# Patient Record
Sex: MALE | Race: WHITE | Employment: FULL TIME | URBAN - METROPOLITAN AREA
[De-identification: names, ages, dates, MRNs, and addresses within clinical notes are randomized per-mention and may not be internally consistent; named-entity substitution may affect disease eponyms.]

---

## 2017-01-23 ENCOUNTER — OFFICE VISIT (OUTPATIENT)
Dept: FAMILY MEDICINE | Facility: CLINIC | Age: 62
End: 2017-01-23
Payer: COMMERCIAL

## 2017-01-23 VITALS
HEART RATE: 111 BPM | TEMPERATURE: 103.9 F | WEIGHT: 191 LBS | OXYGEN SATURATION: 98 % | DIASTOLIC BLOOD PRESSURE: 70 MMHG | BODY MASS INDEX: 25.31 KG/M2 | SYSTOLIC BLOOD PRESSURE: 109 MMHG | HEIGHT: 73 IN

## 2017-01-23 DIAGNOSIS — R31.9 URINARY TRACT INFECTION WITH HEMATURIA, SITE UNSPECIFIED: Primary | ICD-10-CM

## 2017-01-23 DIAGNOSIS — R31.9 BLOOD IN THE URINE: ICD-10-CM

## 2017-01-23 DIAGNOSIS — R82.90 NONSPECIFIC FINDING ON EXAMINATION OF URINE: ICD-10-CM

## 2017-01-23 DIAGNOSIS — N39.0 URINARY TRACT INFECTION WITH HEMATURIA, SITE UNSPECIFIED: Primary | ICD-10-CM

## 2017-01-23 DIAGNOSIS — R30.0 DYSURIA: ICD-10-CM

## 2017-01-23 LAB
ALBUMIN UR-MCNC: 30 MG/DL
APPEARANCE UR: ABNORMAL
BACTERIA #/AREA URNS HPF: ABNORMAL /HPF
BILIRUB UR QL STRIP: NEGATIVE
COLOR UR AUTO: YELLOW
GLUCOSE UR STRIP-MCNC: NEGATIVE MG/DL
HGB UR QL STRIP: ABNORMAL
KETONES UR STRIP-MCNC: NEGATIVE MG/DL
LEUKOCYTE ESTERASE UR QL STRIP: ABNORMAL
NITRATE UR QL: NEGATIVE
NON-SQ EPI CELLS #/AREA URNS LPF: ABNORMAL /LPF
PH UR STRIP: 5.5 PH (ref 5–7)
RBC #/AREA URNS AUTO: ABNORMAL /HPF (ref 0–2)
SP GR UR STRIP: <=1.005 (ref 1–1.03)
URN SPEC COLLECT METH UR: ABNORMAL
UROBILINOGEN UR STRIP-ACNC: 0.2 EU/DL (ref 0.2–1)
WBC #/AREA URNS AUTO: ABNORMAL /HPF (ref 0–2)

## 2017-01-23 PROCEDURE — 87088 URINE BACTERIA CULTURE: CPT | Performed by: FAMILY MEDICINE

## 2017-01-23 PROCEDURE — 99213 OFFICE O/P EST LOW 20 MIN: CPT | Performed by: FAMILY MEDICINE

## 2017-01-23 PROCEDURE — 81001 URINALYSIS AUTO W/SCOPE: CPT | Performed by: FAMILY MEDICINE

## 2017-01-23 PROCEDURE — 87086 URINE CULTURE/COLONY COUNT: CPT | Performed by: FAMILY MEDICINE

## 2017-01-23 PROCEDURE — 87186 SC STD MICRODIL/AGAR DIL: CPT | Performed by: FAMILY MEDICINE

## 2017-01-23 RX ORDER — LEVOFLOXACIN 750 MG/1
750 TABLET, FILM COATED ORAL DAILY
Qty: 7 TABLET | Refills: 0 | Status: SHIPPED | OUTPATIENT
Start: 2017-01-23 | End: 2017-03-23

## 2017-01-23 NOTE — NURSING NOTE
"Chief Complaint   Patient presents with     UTI       Initial /70 mmHg  Pulse 111  Temp(Src) 103.9  F (39.9  C) (Tympanic)  Ht 6' 1\" (1.854 m)  Wt 191 lb (86.637 kg)  BMI 25.20 kg/m2  SpO2 98% Estimated body mass index is 25.2 kg/(m^2) as calculated from the following:    Height as of this encounter: 6' 1\" (1.854 m).    Weight as of this encounter: 191 lb (86.637 kg).  BP completed using cuff size: regular  "

## 2017-01-23 NOTE — PROGRESS NOTES
SUBJECTIVE:                                                    Hawk Nation is a 61 year old male who presents to clinic today for the following health issues:      Genitourinary - Male     Onset: 6 DAYS      Description:   Dysuria (painful urination): YES  Hematuria (blood in urine): YES  Frequency: YES  Are you urinating at night : YES  Hesitancy (delay in urine): YES  Retention (unable to empty): YES  Decrease in urinary flow: YES  Incontinence: no     Progression of Symptoms:  worsening    Accompanying Signs & Symptoms:  Fever: YES- 103.9  Back/Flank pain: YES  Urethral discharge: no   Testicle lumps/masses/pain: no   Nausea and/or vomiting: no   Abdominal pain: no    History:   History of frequent UTI's: YES  History of kidney stones: no   History of hernias: no   Personal or Family history of Prostate problems: no  Sexually active: YES    Precipitating factors:       Alleviating factors:           Therapies Tried and outcome: ibuprofen and tylenol ; Outcome - none      Problem list and histories reviewed & adjusted, as indicated.  Additional history: as documented    Patient Active Problem List   Diagnosis     Depression, major     Erectile dysfunction     GERD (gastroesophageal reflux disease)     Meniere's disease     BPH (benign prostatic hyperplasia)     Incontinence of urine     Hyperlipidemia LDL goal <130     Advanced directives, counseling/discussion     Back pain, chronic     Humerus fracture     Past Surgical History   Procedure Laterality Date     Ent surgery       deviated septum at age 15      Surgical history of -        rectal fistula      C nonspecific procedure  10/15/10     [employer] Laser Ablation of Prostate     Surgical history of -   1/2012     [place]: external sphincter for incontinence       Social History   Substance Use Topics     Smoking status: Former Smoker     Quit date: 01/01/1981     Smokeless tobacco: Former User      Comment: 10 pack year history      Alcohol Use: 0.0  oz/week     0 Standard drinks or equivalent per week      Comment: 12pack per month     Family History   Problem Relation Age of Onset     Depression Sister      Depression Father      Depression Mother      HEART DISEASE Mother      CAD      Lipids Mother      Alzheimer Disease Father      Lipids Maternal Grandmother      Lipids Maternal Grandfather      DIABETES Father      C.A.D. Sister      Asthma No family hx of      Hypertension Father      Cancer - colorectal No family hx of      Prostate Cancer No family hx of      Eye Disorder No family hx of      Thyroid Disease No family hx of          Current Outpatient Prescriptions   Medication Sig Dispense Refill     levofloxacin (LEVAQUIN) 750 MG tablet Take 1 tablet (750 mg) by mouth daily 7 tablet 0     simvastatin (ZOCOR) 40 MG tablet Take 1 tablet by mouth at  bedtime 90 tablet 3     CINNAMON PO Take 600 mg by mouth       NEW MED Tumeric 1.4 bid       Glucosamine-Chondroit-Vit C-Mn (GLUCOSAMINE 1500 COMPLEX) CAPS Take 1 tablet by mouth 2 times daily       ascorbic acid (VITAMIN C) 1000 MG TABS Take 1 tablet (1,000 mg) by mouth daily       traZODone (DESYREL) 50 MG tablet Take 1-2 tablets ( mg) by mouth At Bedtime 90 tablet 1     meclizine (ANTIVERT) 12.5 MG tablet Take 1-2 tablets (12.5-25 mg) by mouth 3 times daily as needed for dizziness 90 tablet 0     PARoxetine (PAXIL) 40 MG tablet Take 1 tablet (40 mg) by mouth daily 90 tablet 1     cyclobenzaprine (FLEXERIL) 10 MG tablet Take 1 tablet (10 mg) by mouth 3 times daily as needed for muscle spasms 90 tablet 1     psyllium (METAMUCIL) 58.6 % POWD Take 4 teaspoonful by mouth 2 times daily       Ranitidine HCl (ZANTAC PO) Take 150 mg by mouth daily as needed       calcium carbonate (TUMS) 500 MG chewable tablet Take 1 chew tab by mouth daily as needed       VITAMIN D, CHOLECALCIFEROL, PO Take 2,000 Units by mouth daily       LANsoprazole (PREVACID) 15 MG capsule Take 1 capsule by mouth 2 times daily.    "    Allergies   Allergen Reactions     Nkda [No Known Drug Allergies]      Recent Labs   Lab Test  03/07/16   0853  03/05/15   0859 02/10/14   08/29/13   1806 03/04/13   A1C   --    --    --    --    --   5.7   LDL  115*  90  117   --    --   115   HDL  56  65  66   --    --   59   TRIG  70  76  76   --    --   61   ALT  28  26   --    --   33   --    CR  1.02  1.00   --    < >  1.16   --    GFRESTIMATED  74  76   --    < >  64   --    GFRESTBLACK  89  >90   GFR Calc     --    < >  78   --    POTASSIUM  4.0  4.1   --    < >  4.3   --     < > = values in this interval not displayed.      BP Readings from Last 3 Encounters:   01/23/17 109/70   03/07/16 110/74   01/27/16 138/80    Wt Readings from Last 3 Encounters:   01/23/17 191 lb (86.637 kg)   03/07/16 179 lb (81.194 kg)   01/27/16 190 lb (86.183 kg)                    ROS:  Constitutional, HEENT, cardiovascular, pulmonary, gi and gu systems are negative, except as otherwise noted.    OBJECTIVE:                                                    /70 mmHg  Pulse 111  Temp(Src) 103.9  F (39.9  C) (Tympanic)  Ht 6' 1\" (1.854 m)  Wt 191 lb (86.637 kg)  BMI 25.20 kg/m2  SpO2 98%  Body mass index is 25.2 kg/(m^2).  GENERAL: healthy, alert and no distress  NECK: no adenopathy, no asymmetry, masses, or scars and thyroid normal to palpation  RESP: lungs clear to auscultation - no rales, rhonchi or wheezes  CV: regular rate and rhythm, normal S1 S2, no S3 or S4, no murmur, click or rub, no peripheral edema and peripheral pulses strong  ABDOMEN: soft, nontender, no hepatosplenomegaly, no masses and bowel sounds normal  MS: no gross musculoskeletal defects noted, no edema         ASSESSMENT/PLAN:                                                    Hawk was seen today for uti.    Diagnoses and all orders for this visit:    Urinary tract infection with hematuria, site unspecified  -     levofloxacin (LEVAQUIN) 750 MG tablet; Take 1 tablet (750 mg) " by mouth daily    Dysuria  -     *UA reflex to Microscopic and Culture ([facility] (except [place] and [place])  -     Urine Microscopic    Blood in the urine  -     *UA reflex to Microscopic and Culture ([facility] (except [place] and [place])    Nonspecific finding on examination of urine  -     Urine Culture Aerobic Bacterial      Has high F/C/diaphoresis, has muscle pain, UA showed sign of UTI, will have him to take abx  If no symptomatic improvement by tomorrow, will have him to go to ER. Pt acknowledged and agreed with the plan       Tj Stover MD  [facility] [place]

## 2017-01-25 ENCOUNTER — HOSPITAL ENCOUNTER (EMERGENCY)
Facility: CLINIC | Age: 62
Discharge: HOME | End: 2017-01-25
Attending: EMERGENCY MEDICINE | Admitting: EMERGENCY MEDICINE
Payer: COMMERCIAL

## 2017-01-25 VITALS
RESPIRATION RATE: 16 BRPM | WEIGHT: 190 LBS | DIASTOLIC BLOOD PRESSURE: 82 MMHG | TEMPERATURE: 98.6 F | SYSTOLIC BLOOD PRESSURE: 129 MMHG | OXYGEN SATURATION: 97 % | HEIGHT: 73 IN | HEART RATE: 86 BPM | BODY MASS INDEX: 25.18 KG/M2

## 2017-01-25 DIAGNOSIS — N39.0 URINARY TRACT INFECTION WITHOUT HEMATURIA, SITE UNSPECIFIED: ICD-10-CM

## 2017-01-25 LAB
ALBUMIN SERPL-MCNC: 2.6 G/DL (ref 3.4–5)
ALBUMIN UR-MCNC: NEGATIVE MG/DL
ALP SERPL-CCNC: 62 U/L (ref 40–150)
ALT SERPL W P-5'-P-CCNC: 27 U/L (ref 0–70)
ANION GAP SERPL CALCULATED.3IONS-SCNC: 10 MMOL/L (ref 3–14)
APPEARANCE UR: CLEAR
AST SERPL W P-5'-P-CCNC: 24 U/L (ref 0–45)
BACTERIA SPEC CULT: ABNORMAL
BILIRUB SERPL-MCNC: 0.7 MG/DL (ref 0.2–1.3)
BILIRUB UR QL STRIP: NEGATIVE
BUN SERPL-MCNC: 13 MG/DL (ref 7–30)
CALCIUM SERPL-MCNC: 8.8 MG/DL (ref 8.5–10.1)
CHLORIDE SERPL-SCNC: 95 MMOL/L (ref 94–109)
CO2 SERPL-SCNC: 27 MMOL/L (ref 20–32)
COLOR UR AUTO: ABNORMAL
CREAT SERPL-MCNC: 1.23 MG/DL (ref 0.66–1.25)
ERYTHROCYTE [DISTWIDTH] IN BLOOD BY AUTOMATED COUNT: 13.8 % (ref 10–15)
GFR SERPL CREATININE-BSD FRML MDRD: 59 ML/MIN/1.7M2
GLUCOSE SERPL-MCNC: 89 MG/DL (ref 70–99)
GLUCOSE UR STRIP-MCNC: NEGATIVE MG/DL
HCT VFR BLD AUTO: 39 % (ref 40–53)
HGB BLD-MCNC: 13.9 G/DL (ref 13.3–17.7)
HGB UR QL STRIP: ABNORMAL
KETONES UR STRIP-MCNC: 5 MG/DL
LEUKOCYTE ESTERASE UR QL STRIP: ABNORMAL
MCH RBC QN AUTO: 31.2 PG (ref 26.5–33)
MCHC RBC AUTO-ENTMCNC: 35.6 G/DL (ref 31.5–36.5)
MCV RBC AUTO: 87 FL (ref 78–100)
MICRO REPORT STATUS: ABNORMAL
MICROORGANISM SPEC CULT: ABNORMAL
NITRATE UR QL: NEGATIVE
PH UR STRIP: 6 PH (ref 5–7)
PLATELET # BLD AUTO: 166 10E9/L (ref 150–450)
POTASSIUM SERPL-SCNC: 3.8 MMOL/L (ref 3.4–5.3)
PROT SERPL-MCNC: 6.9 G/DL (ref 6.8–8.8)
RBC # BLD AUTO: 4.46 10E12/L (ref 4.4–5.9)
RBC #/AREA URNS AUTO: 2 /HPF (ref 0–2)
SODIUM SERPL-SCNC: 132 MMOL/L (ref 133–144)
SP GR UR STRIP: 1 (ref 1–1.03)
SPECIMEN SOURCE: ABNORMAL
SQUAMOUS #/AREA URNS AUTO: <1 /HPF (ref 0–1)
URN SPEC COLLECT METH UR: ABNORMAL
UROBILINOGEN UR STRIP-MCNC: NORMAL MG/DL (ref 0–2)
WBC # BLD AUTO: 8.3 10E9/L (ref 4–11)
WBC #/AREA URNS AUTO: 8 /HPF (ref 0–2)

## 2017-01-25 PROCEDURE — 25000125 ZZHC RX 250: Performed by: EMERGENCY MEDICINE

## 2017-01-25 PROCEDURE — 85027 COMPLETE CBC AUTOMATED: CPT | Performed by: EMERGENCY MEDICINE

## 2017-01-25 PROCEDURE — 87086 URINE CULTURE/COLONY COUNT: CPT | Performed by: EMERGENCY MEDICINE

## 2017-01-25 PROCEDURE — 96365 THER/PROPH/DIAG IV INF INIT: CPT

## 2017-01-25 PROCEDURE — 96361 HYDRATE IV INFUSION ADD-ON: CPT

## 2017-01-25 PROCEDURE — 25000128 H RX IP 250 OP 636: Performed by: EMERGENCY MEDICINE

## 2017-01-25 PROCEDURE — 80053 COMPREHEN METABOLIC PANEL: CPT | Performed by: EMERGENCY MEDICINE

## 2017-01-25 PROCEDURE — 81001 URINALYSIS AUTO W/SCOPE: CPT | Performed by: EMERGENCY MEDICINE

## 2017-01-25 PROCEDURE — 99284 EMERGENCY DEPT VISIT MOD MDM: CPT | Mod: 25

## 2017-01-25 RX ORDER — CEPHALEXIN 500 MG/1
500 CAPSULE ORAL 3 TIMES DAILY
Qty: 21 CAPSULE | Refills: 0 | Status: SHIPPED | OUTPATIENT
Start: 2017-01-25 | End: 2017-02-01

## 2017-01-25 RX ORDER — CEFTRIAXONE 1 G/1
1 INJECTION, POWDER, FOR SOLUTION INTRAMUSCULAR; INTRAVENOUS ONCE
Status: COMPLETED | OUTPATIENT
Start: 2017-01-25 | End: 2017-01-25

## 2017-01-25 RX ADMIN — CEFTRIAXONE 1 G: 1 INJECTION, POWDER, FOR SOLUTION INTRAMUSCULAR; INTRAVENOUS at 12:58

## 2017-01-25 RX ADMIN — SODIUM CHLORIDE 1000 ML: 9 INJECTION, SOLUTION INTRAVENOUS at 12:12

## 2017-01-25 ASSESSMENT — ENCOUNTER SYMPTOMS
FEVER: 1
DIAPHORESIS: 1
CHILLS: 1
HEADACHES: 1
HEMATURIA: 1

## 2017-01-25 NOTE — ED AVS SNAPSHOT
"  Emergency Department    [address] [facility] [address]    Phone:  [phone]    Fax:  [phone]                                       Hawk Nation   MRN: [id number]    Department:   Emergency Department   Date of Visit:  1/25/2017           Patient Information     Date Of Birth          1955        Your diagnoses for this visit were:     Urinary tract infection without hematuria, site unspecified        You were seen by Hernesto Stauffer MD.      Follow-up Information     Follow up with Tj Stover MD In 1 day.    Specialty:  Family Practice    Why:  If symptoms worsen    Contact information:    [facility]  [address] [facility] [id number] [phone]          Discharge Instructions          * BLADDER INFECTION: Male (Adult)    A bladder infection (\"cystitis\" or \"UTI\") usually causes a constant urge to urinate, and a burning when passing urine. Urine may be cloudy, smelly or dark. There may be also be pain in the lower abdomen.  Cystitis in males is not common. It may be caused by a partial blockage in the urinary system that keeps the bladder from emptying completely. This is most often related to an enlarged prostate gland.  HOME CARE:  1. Drink lots of fluids (at least 6-8 glasses a day). This will flush the bacteria out of your bladder. Cranberry juice has been shown to help clear out the bacteria.  2. Avoid sexual intercourse until your symptoms are gone.  3. A bladder infection is treated with antibiotics. You may also be given Pyridium (generic - phenazopyridine) to reduce burning with urination. This will cause urine to become a bright orange color, which can stain clothing.  FOLLOW UP with your doctor or this facility if ALL symptoms have not cleared within five days. It is important to keep your follow up appointment to discuss with your doctor the need for further tests of the urinary tract.  GET PROMPT MEDICAL ATTENTION if any of the " following occur:    Fever over 101  F (38.3  C)    No improvement by the third day of treatment    Increasing back or abdominal pain    Repeated vomiting; unable to keep medicine down    Weakness, dizziness or fainting    2465-1844 [employer], [address] [place] [address], [place] [address]. All rights reserved. This information is not intended as a substitute for professional medical care. Always follow your healthcare professional's instructions.      24 Hour Appointment Hotline       To make an appointment at any [facility], call [phone] ([phone]). If you don't have a family doctor or clinic, we will help you find one. [place] clinics are conveniently located to serve the needs of you and your family.             Review of your medicines      START taking        Dose / Directions Last dose taken    cephALEXin 500 MG capsule   Commonly known as:  KEFLEX   Dose:  500 mg   Quantity:  21 capsule        Take 1 capsule (500 mg) by mouth 3 times daily for 7 days   Refills:  0          Our records show that you are taking the medicines listed below. If these are incorrect, please call your family doctor or clinic.        Dose / Directions Last dose taken    ascorbic acid 1000 MG Tabs   Commonly known as:  vitamin C   Dose:  1000 mg        Take 1 tablet (1,000 mg) by mouth daily   Refills:  0        calcium carbonate 500 MG chewable tablet   Commonly known as:  TUMS   Dose:  1 chew tab        Take 1 chew tab by mouth daily as needed   Refills:  0        CINNAMON PO   Dose:  600 mg        Take 600 mg by mouth   Refills:  0        cyclobenzaprine 10 MG tablet   Commonly known as:  FLEXERIL   Dose:  10 mg   Quantity:  90 tablet        Take 1 tablet (10 mg) by mouth 3 times daily as needed for muscle spasms   Refills:  1        GLUCOSAMINE 1500 COMPLEX Caps   Dose:  1 tablet        Take 1 tablet by mouth 2 times daily   Refills:  0        LANsoprazole 15 MG CR capsule   Commonly known as:  PREVACID    Dose:  1 capsule        Take 1 capsule by mouth 2 times daily.   Refills:  0        levofloxacin 750 MG tablet   Commonly known as:  LEVAQUIN   Dose:  750 mg   Quantity:  7 tablet        Take 1 tablet (750 mg) by mouth daily   Refills:  0        meclizine 12.5 MG tablet   Commonly known as:  ANTIVERT   Dose:  12.5-25 mg   Quantity:  90 tablet        Take 1-2 tablets (12.5-25 mg) by mouth 3 times daily as needed for dizziness   Refills:  0        NEW MED        Tumeric 1.4 bid   Refills:  0        PARoxetine 40 MG tablet   Commonly known as:  PAXIL   Dose:  40 mg   Quantity:  90 tablet        Take 1 tablet (40 mg) by mouth daily   Refills:  1        psyllium 58.6 % Powd   Commonly known as:  METAMUCIL   Dose:  4 teaspoonful        Take 4 teaspoonful by mouth 2 times daily   Refills:  0        simvastatin 40 MG tablet   Commonly known as:  ZOCOR   Quantity:  90 tablet        Take 1 tablet by mouth at  bedtime   Refills:  3        traZODone 50 MG tablet   Commonly known as:  DESYREL   Dose:   mg   Quantity:  90 tablet        Take 1-2 tablets ( mg) by mouth At Bedtime   Refills:  1        VITAMIN D (CHOLECALCIFEROL) PO   Dose:  2000 Units        Take 2,000 Units by mouth daily   Refills:  0        ZANTAC PO   Dose:  150 mg        Take 150 mg by mouth daily as needed   Refills:  0                Prescriptions were sent or printed at these locations (1 Prescription)                   Other Prescriptions                Printed at Department/Unit printer (1 of 1)         cephALEXin (KEFLEX) 500 MG capsule                Procedures and tests performed during your visit     CBC with platelets    Comprehensive metabolic panel    UA reflex to Microscopic and Culture    Urine Culture Aerobic Bacterial      Orders Needing Specimen Collection     None      Pending Results     Date and Time Order Name Status Description    1/25/2017 1240 Urine Culture Aerobic Bacterial In process             Pending Culture Results      Date and Time Order Name Status Description    1/25/2017 1240 Urine Culture Aerobic Bacterial In process        Test Results from your hospital stay           1/25/2017 12:58 PM - Interface, Flexilab Results      Component Results     Component Value Ref Range & Units Status    Color Urine Light Yellow  Final    Appearance Urine Clear  Final    Glucose Urine Negative NEG mg/dL Final    Bilirubin Urine Negative NEG Final    Ketones Urine 5 (A) NEG mg/dL Final    Specific Gravity Urine 1.004 1.003 - 1.035 Final    Blood Urine Trace (A) NEG Final    pH Urine 6.0 5.0 - 7.0 pH Final    Protein Albumin Urine Negative NEG mg/dL Final    Urobilinogen mg/dL Normal 0.0 - 2.0 mg/dL Final    Nitrite Urine Negative NEG Final    Leukocyte Esterase Urine Moderate (A) NEG Final    Source Midstream Urine  Final    RBC Urine 2 0 - 2 /HPF Final    WBC Urine 8 (H) 0 - 2 /HPF Final    Squamous Epithelial /HPF Urine <1 0 - 1 /HPF Final         1/25/2017 12:16 PM - Interface, Flexilab Results      Component Results     Component Value Ref Range & Units Status    Sodium 132 (L) 133 - 144 mmol/L Final    Potassium 3.8 3.4 - 5.3 mmol/L Final    Chloride 95 94 - 109 mmol/L Final    Carbon Dioxide 27 20 - 32 mmol/L Final    Anion Gap 10 3 - 14 mmol/L Final    Glucose 89 70 - 99 mg/dL Final    Urea Nitrogen 13 7 - 30 mg/dL Final    Creatinine 1.23 0.66 - 1.25 mg/dL Final    GFR Estimate 59 (L) >60 mL/min/1.7m2 Final    Non  GFR Calc    GFR Estimate If Black 72 >60 mL/min/1.7m2 Final    African American GFR Calc    Calcium 8.8 8.5 - 10.1 mg/dL Final    Bilirubin Total 0.7 0.2 - 1.3 mg/dL Final    Albumin 2.6 (L) 3.4 - 5.0 g/dL Final    Protein Total 6.9 6.8 - 8.8 g/dL Final    Alkaline Phosphatase 62 40 - 150 U/L Final    ALT 27 0 - 70 U/L Final    AST 24 0 - 45 U/L Final         1/25/2017 11:53 AM - Interface, Flexilab Results      Component Results     Component Value Ref Range & Units Status    WBC 8.3 4.0 - 11.0 10e9/L  Final    RBC Count 4.46 4.4 - 5.9 10e12/L Final    Hemoglobin 13.9 13.3 - 17.7 g/dL Final    Hematocrit 39.0 (L) 40.0 - 53.0 % Final    MCV 87 78 - 100 fl Final    MCH 31.2 26.5 - 33.0 pg Final    MCHC 35.6 31.5 - 36.5 g/dL Final    RDW 13.8 10.0 - 15.0 % Final    Platelet Count 166 150 - 450 10e9/L Final         1/25/2017 12:59 PM - Interface, Flexilab Results                Clinical Quality Measure: Blood Pressure Screening     Your blood pressure was checked while you were in the emergency department today. The last reading we obtained was  BP: 140/83 mmHg . Please read the guidelines below about what these numbers mean and what you should do about them.  If your systolic blood pressure (the top number) is less than 120 and your diastolic blood pressure (the bottom number) is less than 80, then your blood pressure is normal. There is nothing more that you need to do about it.  If your systolic blood pressure (the top number) is 120-139 or your diastolic blood pressure (the bottom number) is 80-89, your blood pressure may be higher than it should be. You should have your blood pressure rechecked within a year by a primary care provider.  If your systolic blood pressure (the top number) is 140 or greater or your diastolic blood pressure (the bottom number) is 90 or greater, you may have high blood pressure. High blood pressure is treatable, but if left untreated over time it can put you at risk for heart attack, stroke, or kidney failure. You should have your blood pressure rechecked by a primary care provider within the next 4 weeks.  If your provider in the emergency department today gave you specific instructions to follow-up with your doctor or provider even sooner than that, you should follow that instruction and not wait for up to 4 weeks for your follow-up visit.        Thank you for choosing [facility]       Thank you for choosing [facility] for your care. Our goal is always to provide you with excellent care.  Hearing back from our patients is one way we can continue to improve our services. Please take a few minutes to complete the written survey that you may receive in the mail after you visit with us. Thank you!        [employer] Information     "[employer] gives you secure access to your electronic health record. If you see a primary care provider, you can also send messages to your care team and make appointments. If you have questions, please call your primary care clinic.  If you do not have a primary care provider, please call [phone] and they will assist you.        Care EveryWhere ID     This is your Care EveryWhere ID. This could be used by other organizations to access your [facility] medical records  [id number]        After Visit Summary       This is your record. Keep this with you and show to your community pharmacist(s) and doctor(s) at your next visit.

## 2017-01-25 NOTE — ED AVS SNAPSHOT
Emergency Department    [address] [place] [address]    Phone:  [phone]    Fax:  [phone]                                       Hawk Nation   MRN: [id number]    Department:   Emergency Department   Date of Visit:  1/25/2017           After Visit Summary Signature Page     I have received my discharge instructions, and my questions have been answered. I have discussed any challenges I see with this plan with the nurse or doctor.    ..........................................................................................................................................  Patient/Patient Representative Signature      ..........................................................................................................................................  Patient Representative Print Name and Relationship to Patient    ..................................................               ................................................  Date                                            Time    ..........................................................................................................................................  Reviewed by Signature/Title    ...................................................              ..............................................  Date                                                            Time

## 2017-01-25 NOTE — DISCHARGE INSTRUCTIONS
"   * BLADDER INFECTION: Male (Adult)    A bladder infection (\"cystitis\" or \"UTI\") usually causes a constant urge to urinate, and a burning when passing urine. Urine may be cloudy, smelly or dark. There may be also be pain in the lower abdomen.  Cystitis in males is not common. It may be caused by a partial blockage in the urinary system that keeps the bladder from emptying completely. This is most often related to an enlarged prostate gland.  HOME CARE:  1. Drink lots of fluids (at least 6-8 glasses a day). This will flush the bacteria out of your bladder. Cranberry juice has been shown to help clear out the bacteria.  2. Avoid sexual intercourse until your symptoms are gone.  3. A bladder infection is treated with antibiotics. You may also be given Pyridium (generic - phenazopyridine) to reduce burning with urination. This will cause urine to become a bright orange color, which can stain clothing.  FOLLOW UP with your doctor or this facility if ALL symptoms have not cleared within five days. It is important to keep your follow up appointment to discuss with your doctor the need for further tests of the urinary tract.  GET PROMPT MEDICAL ATTENTION if any of the following occur:    Fever over 101  F (38.3  C)    No improvement by the third day of treatment    Increasing back or abdominal pain    Repeated vomiting; unable to keep medicine down    Weakness, dizziness or fainting    1536-3582 [employer], [address] [place] [address], [place] [address]. All rights reserved. This information is not intended as a substitute for professional medical care. Always follow your healthcare professional's instructions.    "

## 2017-01-25 NOTE — ED PROVIDER NOTES
History     Chief Complaint:  UTI      HPI   [place] is a 61 year old male who presents with urinary tract infection symptoms. The patient had an onset on 1/17 of urgency with little urine output and hematuria. The patient also complains of fever, chills, diaphoresis, and headache. The patient states that he had a fever last night with headache that has currently .    Allergies:  No known drug allergies.     Medications:    Levofloxacin (LEVAQUIN) 750 MG tablet  Simvastatin (ZOCOR) 40 MG tablet  CINNAMON PO  Glucosamine-Chondroit-Vit C-Mn (GLUCOSAMINE 1500 COMPLEX) CAPS  Ascorbic acid (VITAMIN C) 1000 MG TABS  Trazodone (DESYREL) 50 MG tablet  Meclizine (ANTIVERT) 12.5 MG tablet  Paroxetine (PAXIL) 40 MG tablet  Cyclobenzaprine (FLEXERIL) 10 MG tablet  Psyllium (METAMUCIL) 58.6 % POWD  Ranitidine HCl (ZANTAC PO)  Calcium carbonate (TUMS) 500 MG chewable tablet  VITAMIN D, CHOLECALCIFEROL, PO  LANsoprazole (PREVACID) 15 MG capsule     Past Medical History:    Depression, major   Hyperlipidemia LDL goal < 100   Erectile dysfunction   GERD (gastroesophageal reflux disease)   Diverticulosis   Meniere's disease   BPH (benign prostatic hyperplasia)  Incontinence of urine    Past Surgical History:    ENT surgery  Rectal fistula  Green light laser ablation of prostate  External sphincter for incontinence    Family History:    Mother: Depression, lipids, heart disease  Father: Depression, Alzheimer's disease, diabetes, hypertension  Sister: Depression, coronary artery disease    Social History:  Marital Status:   Presents to the ED with friend  Tobacco Use: Quit 1981  Alcohol Use: 12 drinks a month  PCP: Tj Stover     Review of Systems   Constitutional: Positive for fever, chills and diaphoresis.   Genitourinary: Positive for urgency and hematuria.   Neurological: Positive for headaches.   All other systems reviewed and are negative.    Physical Exam   Physical Exam    Patient Vitals for the past 24  "hrs:   BP Temp Temp src Pulse Resp SpO2 Height Weight   01/25/17 1122 140/83 mmHg 98.6  F (37  C) Oral 86 16 97 % 1.854 m (6' 1\") 86.183 kg (190 lb)       General: Alert and Interactive.   Head: No signs of trauma.   Mouth/Throat: Oropharynx is clear and moist.   Eyes: Conjunctivae are normal. Pupils are equal, round, and reactive to light.   Neck: Normal range of motion. No nuchal rigidity.   CV: Normal rate and regular rhythm.    Resp: Effort normal and breath sounds normal. No respiratory distress.   GI: Soft. There is no tenderness or guarding.   MSK: Normal range of motion. no edema.   Neuro: The patient is alert and oriented to person, place, and time.  PERRLA, EOMI, strength in upper/lower extremities normal and symmetrical.   Sensation normal. Speech normal.  GCS eye subscore is 4. GCS verbal subscore is 5. GCS motor subscore is 6.   Skin: Skin is warm and dry. No rash noted.   Psych: normal mood and affect. behavior is normal.     Emergency Department Course   Laboratory:  CBC: WBC 8.3, HGB 13.9,   CMP:  (L), GFR 59 (L), Albumin 2.6 (L), Rest WNL (Creatinine 1.23)    UA: Clear light yellow urine, Blood Trace, Leukocyte Esterase Moderate, WBC 8 (H), otherwise WNL  Urine Culture: pending     Interventions:  (1212) Normal Saline, 1 liter, IV bolus  (1258) Ceftriaxone, 1 g, IV infusion    ED Course:  Nursing notes and past medical history reviewed.   I performed a physical examination of the patient as documented above.  I explained the plan with the patient who consents to this.   Blood was drawn from the patient. This was sent for laboratory testing, findings above.   Urine sample was obtained and sent for laboratory analysis, findings above.  Findings and plan explained to the patient. Patient discharged home with instructions regarding supportive care, medications, and reasons to return. The importance of close follow-up was reviewed. The patient was prescribed cephalexin.    Impression & Plan  "   Medical Decision Making:  [place] is a 61 year old male who presents for evaluation of hematuria and urgency. This clinically is consistent with a urinary tract infection. Urinalysis confirms the infection. There has been no fever, back/flank pain or significant abdominal pain. There is no clinical evidence of pyelonephritis, appendicitis, colitis, diverticulitis or any intraabdominal catastrophe. The patient will be started on antibiotics for the infection. Return if increasing pain, vomiting, fever, or inability to tolerate the oral antibiotic. Follow up with primary physician is indicated if not improving in 2-3 days.     Diagnosis:    ICD-10-CM    1. Urinary tract infection without hematuria, site unspecified N39.0        Disposition:   Discharge to home.     Discharge Medications:   New Prescriptions    CEPHALEXIN (KEFLEX) 500 MG CAPSULE    Take 1 capsule (500 mg) by mouth 3 times daily for 7 days         Gene TILLMAN am serving as a scribe on 1/25/2017 at 11:50 AM to personally document services performed by Hernesto Stauffer MD, based on my observations and the provider's statements to me.      Hernesto Stauffer MD  01/25/17 1549

## 2017-01-26 LAB
BACTERIA SPEC CULT: NO GROWTH
Lab: NORMAL
MICRO REPORT STATUS: NORMAL
SPECIMEN SOURCE: NORMAL

## 2017-03-23 ENCOUNTER — OFFICE VISIT (OUTPATIENT)
Dept: FAMILY MEDICINE | Facility: CLINIC | Age: 62
End: 2017-03-23
Payer: COMMERCIAL

## 2017-03-23 VITALS
BODY MASS INDEX: 22.8 KG/M2 | RESPIRATION RATE: 16 BRPM | TEMPERATURE: 97.6 F | WEIGHT: 172 LBS | OXYGEN SATURATION: 100 % | SYSTOLIC BLOOD PRESSURE: 130 MMHG | HEIGHT: 73 IN | DIASTOLIC BLOOD PRESSURE: 74 MMHG | HEART RATE: 76 BPM

## 2017-03-23 DIAGNOSIS — Z12.5 SCREENING FOR PROSTATE CANCER: ICD-10-CM

## 2017-03-23 DIAGNOSIS — Z00.00 HEALTHCARE MAINTENANCE: Primary | ICD-10-CM

## 2017-03-23 LAB
ALBUMIN SERPL-MCNC: 4.1 G/DL (ref 3.4–5)
ALP SERPL-CCNC: 57 U/L (ref 40–150)
ALT SERPL W P-5'-P-CCNC: 19 U/L (ref 0–70)
ANION GAP SERPL CALCULATED.3IONS-SCNC: 5 MMOL/L (ref 3–14)
AST SERPL W P-5'-P-CCNC: 16 U/L (ref 0–45)
BILIRUB SERPL-MCNC: 0.7 MG/DL (ref 0.2–1.3)
BUN SERPL-MCNC: 11 MG/DL (ref 7–30)
CALCIUM SERPL-MCNC: 9.2 MG/DL (ref 8.5–10.1)
CHLORIDE SERPL-SCNC: 100 MMOL/L (ref 94–109)
CHOLEST SERPL-MCNC: 164 MG/DL
CO2 SERPL-SCNC: 32 MMOL/L (ref 20–32)
CREAT SERPL-MCNC: 1.12 MG/DL (ref 0.66–1.25)
ERYTHROCYTE [DISTWIDTH] IN BLOOD BY AUTOMATED COUNT: 13.7 % (ref 10–15)
GFR SERPL CREATININE-BSD FRML MDRD: 66 ML/MIN/1.7M2
GLUCOSE SERPL-MCNC: 85 MG/DL (ref 70–99)
HCT VFR BLD AUTO: 45.6 % (ref 40–53)
HDLC SERPL-MCNC: 60 MG/DL
HGB BLD-MCNC: 15.2 G/DL (ref 13.3–17.7)
LDLC SERPL CALC-MCNC: 87 MG/DL
MCH RBC QN AUTO: 30.6 PG (ref 26.5–33)
MCHC RBC AUTO-ENTMCNC: 33.3 G/DL (ref 31.5–36.5)
MCV RBC AUTO: 92 FL (ref 78–100)
NONHDLC SERPL-MCNC: 104 MG/DL
PLATELET # BLD AUTO: 199 10E9/L (ref 150–450)
POTASSIUM SERPL-SCNC: 3.9 MMOL/L (ref 3.4–5.3)
PROT SERPL-MCNC: 7.5 G/DL (ref 6.8–8.8)
PSA SERPL-ACNC: 1.94 UG/L (ref 0–4)
RBC # BLD AUTO: 4.97 10E12/L (ref 4.4–5.9)
SODIUM SERPL-SCNC: 137 MMOL/L (ref 133–144)
TRIGL SERPL-MCNC: 83 MG/DL
WBC # BLD AUTO: 6.5 10E9/L (ref 4–11)

## 2017-03-23 PROCEDURE — 99396 PREV VISIT EST AGE 40-64: CPT | Performed by: FAMILY MEDICINE

## 2017-03-23 PROCEDURE — 36415 COLL VENOUS BLD VENIPUNCTURE: CPT | Performed by: FAMILY MEDICINE

## 2017-03-23 PROCEDURE — 80061 LIPID PANEL: CPT | Performed by: FAMILY MEDICINE

## 2017-03-23 PROCEDURE — G0103 PSA SCREENING: HCPCS | Performed by: FAMILY MEDICINE

## 2017-03-23 PROCEDURE — 85027 COMPLETE CBC AUTOMATED: CPT | Performed by: FAMILY MEDICINE

## 2017-03-23 PROCEDURE — 80053 COMPREHEN METABOLIC PANEL: CPT | Performed by: FAMILY MEDICINE

## 2017-03-23 ASSESSMENT — ANXIETY QUESTIONNAIRES
3. WORRYING TOO MUCH ABOUT DIFFERENT THINGS: SEVERAL DAYS
1. FEELING NERVOUS, ANXIOUS, OR ON EDGE: SEVERAL DAYS
7. FEELING AFRAID AS IF SOMETHING AWFUL MIGHT HAPPEN: NOT AT ALL
IF YOU CHECKED OFF ANY PROBLEMS ON THIS QUESTIONNAIRE, HOW DIFFICULT HAVE THESE PROBLEMS MADE IT FOR YOU TO DO YOUR WORK, TAKE CARE OF THINGS AT HOME, OR GET ALONG WITH OTHER PEOPLE: NOT DIFFICULT AT ALL
GAD7 TOTAL SCORE: 5
5. BEING SO RESTLESS THAT IT IS HARD TO SIT STILL: NOT AT ALL
2. NOT BEING ABLE TO STOP OR CONTROL WORRYING: SEVERAL DAYS
6. BECOMING EASILY ANNOYED OR IRRITABLE: SEVERAL DAYS

## 2017-03-23 ASSESSMENT — PATIENT HEALTH QUESTIONNAIRE - PHQ9: 5. POOR APPETITE OR OVEREATING: SEVERAL DAYS

## 2017-03-23 NOTE — PROGRESS NOTES
"Chief Complaint   Patient presents with     Physical       Initial /74  Pulse 76  Temp 97.6  F (36.4  C)  Resp 16  Ht 6' 0.5\" (1.842 m)  Wt 172 lb (78 kg)  SpO2 100%  BMI 23.01 kg/m2 Estimated body mass index is 23.01 kg/(m^2) as calculated from the following:    Height as of this encounter: 6' 0.5\" (1.842 m).    Weight as of this encounter: 172 lb (78 kg).  Medication Reconciliation: complete. ISABELLA Mendiola LPN      Answers for HPI/ROS submitted by the patient on 3/20/2017   Annual Exam:  Getting at least 3 servings of Calcium per day:: Yes  Bi-annual eye exam:: Yes  Dental care twice a year:: Yes  Sleep apnea or symptoms of sleep apnea:: None  Diet:: Regular (no restrictions)  Frequency of exercise:: None  Taking medications regularly:: Yes  Medication side effects:: None  Additional concerns today:: No  Q1: Little interest or pleasure in doing things: 0=Not at all  Q2: Feeling down, depressed or hopeless: 0=Not at all  PHQ-2 Score: 0    SUBJECTIVE:     CC: [place] Nation is an 61 year old male who presents for preventative health visit.     Healthy Habits:    Do you get at least three servings of calcium containing foods daily (dairy, green leafy vegetables, etc.)? yes and no, taking calcium and/or vitamin D supplement: yes -     Amount of exercise or daily activities, outside of work: 0 day(s) per week    Problems taking medications regularly No    Medication side effects: No    Have you had an eye exam in the past two years? yes    Do you see a dentist twice per year? yes    Do you have sleep apnea, excessive snoring or daytime drowsiness? Does snore -        Today's PHQ-2 Score:   PHQ-2 ( 1999 [employer]) 3/20/2017 3/7/2016   Q1: Little interest or pleasure in doing things - 0   Q2: Feeling down, depressed or hopeless - 1   PHQ-2 Score - 1   Little interest or pleasure in doing things Not at all -   Feeling down, depressed or hopeless Not at all -   PHQ-2 Score 0 -       Abuse: Current or " Past(Physical, Sexual or Emotional)- No  Do you feel safe in your environment - Yes    Social History   Substance Use Topics     Smoking status: Former Smoker     Quit date: 1/1/1981     Smokeless tobacco: Former User      Comment: 10 pack year history      Alcohol use 0.0 oz/week     0 Standard drinks or equivalent per week      Comment: 12pack per month     The patient does not drink >3 drinks per day nor >7 drinks per week.    Last PSA:   PSA   Date Value Ref Range Status   03/07/2016 2.37 0 - 4 ug/L Final       Recent Labs   Lab Test  03/07/16   0853  03/05/15   0859   05/01/12   0845   CHOL  185  170   < >  177   HDL  56  65   < >  45   LDL  115*  90   < >  117   TRIG  70  76   < >  76   CHOLHDLRATIO   --   2.6   --   3.9   NHDL  129   --    --    --     < > = values in this interval not displayed.       Reviewed orders with patient. Reviewed health maintenance and updated orders accordingly - Yes    ISABELLA Mendiola LPN      Reviewed and updated as needed this visit by clinical staff  Tobacco  Allergies  Meds  Med Hx  Surg Hx  Fam Hx  Soc Hx        Reviewed and updated as needed this visit by Provider        Past Medical History:   Diagnosis Date     BPH (benign prostatic hyperplasia) 10/13/2010     Depression, major      Diverticulosis      Erectile dysfunction      GERD (gastroesophageal reflux disease)      Hyperlipidemia LDL goal < 100      Incontinence of urine 1/11/2011     Meniere's disease 1/6/2010      Past Surgical History:   Procedure Laterality Date     C NONSPECIFIC PROCEDURE  10/15/10    GreenLight Laser Ablation of Prostate     ENT SURGERY      deviated septum at age 15      SURGICAL HISTORY OF -       rectal fistula      SURGICAL HISTORY OF -   1/2012    [place]: external sphincter for incontinence       ROS:  C: NEGATIVE for fever, chills, change in weight  I: NEGATIVE for worrisome rashes, moles or lesions  E: NEGATIVE for vision changes or irritation  ENT: NEGATIVE for ear, mouth and throat  problems  R: NEGATIVE for significant cough or SOB  CV: NEGATIVE for chest pain, palpitations or peripheral edema  GI: NEGATIVE for nausea, abdominal pain, heartburn, or change in bowel habits   male: negative for dysuria, hematuria, decreased urinary stream, erectile dysfunction, urethral discharge  M: NEGATIVE for significant arthralgias or myalgia  N: NEGATIVE for weakness, dizziness or paresthesias  P: NEGATIVE for changes in mood or affect    BP Readings from Last 3 Encounters:   03/23/17 130/74   01/25/17 129/82   01/23/17 109/70    Wt Readings from Last 3 Encounters:   03/23/17 172 lb (78 kg)   01/25/17 190 lb (86.2 kg)   01/23/17 191 lb (86.6 kg)                  Patient Active Problem List   Diagnosis     Depression, major     Erectile dysfunction     GERD (gastroesophageal reflux disease)     Meniere's disease     BPH (benign prostatic hyperplasia)     Incontinence of urine     Hyperlipidemia LDL goal <130     Advanced directives, counseling/discussion     Back pain, chronic     Humerus fracture     Past Surgical History:   Procedure Laterality Date     C NONSPECIFIC PROCEDURE  10/15/10    GreenLight Laser Ablation of Prostate     ENT SURGERY      deviated septum at age 15      SURGICAL HISTORY OF -       rectal fistula      SURGICAL HISTORY OF -   1/2012    [place]: external sphincter for incontinence       Social History   Substance Use Topics     Smoking status: Former Smoker     Quit date: 1/1/1981     Smokeless tobacco: Former User      Comment: 10 pack year history      Alcohol use 0.0 oz/week     0 Standard drinks or equivalent per week      Comment: 12pack per month     Family History   Problem Relation Age of Onset     Depression Sister      Depression Mother      HEART DISEASE Mother      CAD      Lipids Mother      Depression Father      Alzheimer Disease Father      DIABETES Father      Hypertension Father      Lipids Maternal Grandmother      Lipids Maternal Grandfather      C.A.D. Sister       Asthma No family hx of      Cancer - colorectal No family hx of      Prostate Cancer No family hx of      Eye Disorder No family hx of      Thyroid Disease No family hx of          Current Outpatient Prescriptions   Medication Sig Dispense Refill     ClonazePAM (KLONOPIN PO) Take 0.5 mg by mouth 3 times daily as needed for anxiety       VITAMIN D, CHOLECALCIFEROL, PO Take 5,000 Units by mouth daily       simvastatin (ZOCOR) 40 MG tablet Take 1 tablet by mouth at  bedtime 90 tablet 3     NEW MED Tumeric 1.4 bid       traZODone (DESYREL) 50 MG tablet Take 1-2 tablets ( mg) by mouth At Bedtime 90 tablet 1     meclizine (ANTIVERT) 12.5 MG tablet Take 1-2 tablets (12.5-25 mg) by mouth 3 times daily as needed for dizziness 90 tablet 0     PARoxetine (PAXIL) 40 MG tablet Take 1 tablet (40 mg) by mouth daily 90 tablet 1     cyclobenzaprine (FLEXERIL) 10 MG tablet Take 1 tablet (10 mg) by mouth 3 times daily as needed for muscle spasms 90 tablet 1     psyllium (METAMUCIL) 58.6 % POWD Take 4 teaspoonful by mouth 2 times daily       Ranitidine HCl (ZANTAC PO) Take 150 mg by mouth daily as needed       calcium carbonate (TUMS) 500 MG chewable tablet Take 1 chew tab by mouth daily as needed       LANsoprazole (PREVACID) 15 MG capsule Take 1 capsule by mouth 2 times daily.       Allergies   Allergen Reactions     Nkda [No Known Drug Allergies]      Recent Labs   Lab Test  01/25/17   1144  03/07/16   0853  03/05/15   0859 02/10/14  03/04/13   A1C   --    --    --    --    --   5.7   LDL   --   115*  90  117   --   115   HDL   --   56  65  66   --   59   TRIG   --   70  76  76   --   61   ALT  27  28  26   --    < >   --    CR  1.23  1.02  1.00   --    < >   --    GFRESTIMATED  59*  74  76   --    < >   --    GFRESTBLACK  72  89  >90   GFR Calc     --    < >   --    POTASSIUM  3.8  4.0  4.1   --    < >   --     < > = values in this interval not displayed.      OBJECTIVE:     /74  Pulse 76  Temp 97.6  F  "(36.4  C)  Resp 16  Ht 6' 0.5\" (1.842 m)  Wt 172 lb (78 kg)  SpO2 100%  BMI 23.01 kg/m2  EXAM:  GENERAL: healthy, alert and no distress  EYES: Eyes grossly normal to inspection, PERRL and conjunctivae and sclerae normal  HENT: ear canals and TM's normal, nose and mouth without ulcers or lesions  NECK: no adenopathy, no asymmetry, masses, or scars and thyroid normal to palpation  RESP: lungs clear to auscultation - no rales, rhonchi or wheezes  CV: regular rate and rhythm, normal S1 S2, no S3 or S4, no murmur, click or rub, no peripheral edema and peripheral pulses strong  ABDOMEN: soft, nontender, no hepatosplenomegaly, no masses and bowel sounds normal   (male): normal male genitalia without lesions or urethral discharge, no hernia  MS: no gross musculoskeletal defects noted, no edema  SKIN: no suspicious lesions or rashes  NEURO: Normal strength and tone, mentation intact and speech normal  BACK: no CVA tenderness, no paralumbar tenderness  PSYCH: mentation appears normal, affect normal/bright  LYMPH: no cervical, supraclavicular, axillary, or inguinal adenopathy    ASSESSMENT/PLAN:         ICD-10-CM    1. Healthcare maintenance Z00.00 CBC with platelets     Comprehensive metabolic panel (BMP + Alb, Alk Phos, ALT, AST, Total. Bili, TP)     Lipid Profile with reflex to direct LDL     PSA, screen   2. Screening for prostate cancer Z12.5 PSA, screen       COUNSELING:  Reviewed preventive health counseling, as reflected in patient instructions         reports that he quit smoking about 36 years ago. He has quit using smokeless tobacco.    Estimated body mass index is 23.01 kg/(m^2) as calculated from the following:    Height as of this encounter: 6' 0.5\" (1.842 m).    Weight as of this encounter: 172 lb (78 kg).       Counseling Resources:  ATP IV Guidelines  Pooled Cohorts Equation Calculator  FRAX Risk Assessment  ICSI Preventive Guidelines  Dietary Guidelines for Americans, 2010  USDA's MyPlate  ASA " Prophylaxis  Lung CA Screening    Tj Stover MD  [facility]

## 2017-03-23 NOTE — PATIENT INSTRUCTIONS
Preventive Health Recommendations  Male Ages 50 - 64    Yearly exam:             See your health care provider every year in order to  o   Review health changes.   o   Discuss preventive care.    o   Review your medicines if your doctor has prescribed any.     Have a cholesterol test every 5 years, or more frequently if you are at risk for high cholesterol/heart disease.     Have a diabetes test (fasting glucose) every three years. If you are at risk for diabetes, you should have this test more often.     Have a colonoscopy at age 50, or have a yearly FIT test (stool test). These exams will check for colon cancer.      Talk with your health care provider about whether or not a prostate cancer screening test (PSA) is right for you.    You should be tested each year for STDs (sexually transmitted diseases), if you re at risk.     Shots: Get a flu shot each year. Get a tetanus shot every 10 years.     Nutrition:    Eat at least 5 servings of fruits and vegetables daily.     Eat whole-grain bread, whole-wheat pasta and brown rice instead of white grains and rice.     Talk to your provider about Calcium and Vitamin D.     Lifestyle    Exercise for at least 150 minutes a week (30 minutes a day, 5 days a week). This will help you control your weight and prevent disease.     Limit alcohol to one drink per day.     No smoking.     Wear sunscreen to prevent skin cancer.     See your dentist every six months for an exam and cleaning.     See your eye doctor every 1 to 2 years.    Preventive Health Recommendations  Male Ages 50 - 64    Yearly exam:             See your health care provider every year in order to  o   Review health changes.   o   Discuss preventive care.    o   Review your medicines if your doctor has prescribed any.     Have a cholesterol test every 5 years, or more frequently if you are at risk for high cholesterol/heart disease.     Have a diabetes test (fasting glucose) every three years. If you are at  risk for diabetes, you should have this test more often.     Have a colonoscopy at age 50, or have a yearly FIT test (stool test). These exams will check for colon cancer.      Talk with your health care provider about whether or not a prostate cancer screening test (PSA) is right for you.    You should be tested each year for STDs (sexually transmitted diseases), if you re at risk.     Shots: Get a flu shot each year. Get a tetanus shot every 10 years.     Nutrition:    Eat at least 5 servings of fruits and vegetables daily.     Eat whole-grain bread, whole-wheat pasta and brown rice instead of white grains and rice.     Talk to your provider about Calcium and Vitamin D.     Lifestyle    Exercise for at least 150 minutes a week (30 minutes a day, 5 days a week). This will help you control your weight and prevent disease.     Limit alcohol to one drink per day.     No smoking.     Wear sunscreen to prevent skin cancer.     See your dentist every six months for an exam and cleaning.     See your eye doctor every 1 to 2 years.

## 2017-03-23 NOTE — MR AVS SNAPSHOT
After Visit Summary   3/23/2017    [place]    MRN: [id number]           Patient Information     Date Of Birth          1955        Visit Information        Provider Department      3/23/2017 9:20 AM Tj Stover MD [facility]        Today's [facility] maintenance    -  1    Screening for prostate cancer          Care Instructions      Preventive Health Recommendations  Male Ages 50 - 64    Yearly exam:             See your health care provider every year in order to  o   Review health changes.   o   Discuss preventive care.    o   Review your medicines if your doctor has prescribed any.     Have a cholesterol test every 5 years, or more frequently if you are at risk for high cholesterol/heart disease.     Have a diabetes test (fasting glucose) every three years. If you are at risk for diabetes, you should have this test more often.     Have a colonoscopy at age 50, or have a yearly FIT test (stool test). These exams will check for colon cancer.      Talk with your health care provider about whether or not a prostate cancer screening test (PSA) is right for you.    You should be tested each year for STDs (sexually transmitted diseases), if you re at risk.     Shots: Get a flu shot each year. Get a tetanus shot every 10 years.     Nutrition:    Eat at least 5 servings of fruits and vegetables daily.     Eat whole-grain bread, whole-wheat pasta and brown rice instead of white grains and rice.     Talk to your provider about Calcium and Vitamin D.     Lifestyle    Exercise for at least 150 minutes a week (30 minutes a day, 5 days a week). This will help you control your weight and prevent disease.     Limit alcohol to one drink per day.     No smoking.     Wear sunscreen to prevent skin cancer.     See your dentist every six months for an exam and cleaning.     See your eye doctor every 1 to 2 years.    Preventive Health Recommendations  Male Ages 50 -  64    Yearly exam:             See your health care provider every year in order to  o   Review health changes.   o   Discuss preventive care.    o   Review your medicines if your doctor has prescribed any.     Have a cholesterol test every 5 years, or more frequently if you are at risk for high cholesterol/heart disease.     Have a diabetes test (fasting glucose) every three years. If you are at risk for diabetes, you should have this test more often.     Have a colonoscopy at age 50, or have a yearly FIT test (stool test). These exams will check for colon cancer.      Talk with your health care provider about whether or not a prostate cancer screening test (PSA) is right for you.    You should be tested each year for STDs (sexually transmitted diseases), if you re at risk.     Shots: Get a flu shot each year. Get a tetanus shot every 10 years.     Nutrition:    Eat at least 5 servings of fruits and vegetables daily.     Eat whole-grain bread, whole-wheat pasta and brown rice instead of white grains and rice.     Talk to your provider about Calcium and Vitamin D.     Lifestyle    Exercise for at least 150 minutes a week (30 minutes a day, 5 days a week). This will help you control your weight and prevent disease.     Limit alcohol to one drink per day.     No smoking.     Wear sunscreen to prevent skin cancer.     See your dentist every six months for an exam and cleaning.     See your eye doctor every 1 to 2 years.          Follow-ups after your visit        Who to contact     If you have questions or need follow up information about today's clinic visit or your schedule please contact [facility] [place] directly at [phone].  Normal or non-critical lab and imaging results will be communicated to you by MyChart, letter or phone within 4 business days after the clinic has received the results. If you do not hear from us within 7 days, please contact the clinic through MyChart or phone. If you have a  "critical or abnormal lab result, we will notify you by phone as soon as possible.  Submit refill requests through [employer] or call your pharmacy and they will forward the refill request to us. Please allow 3 business days for your refill to be completed.          Additional Information About Your Visit        Workechart Information     [employer] gives you secure access to your electronic health record. If you see a primary care provider, you can also send messages to your care team and make appointments. If you have questions, please call your primary care clinic.  If you do not have a primary care provider, please call [phone] and they will assist you.        Care EveryWhere ID     This is your Care EveryWhere ID. This could be used by other organizations to access your [place] medical records  [id number]        Your Vitals Were     Pulse Temperature Respirations Height Pulse Oximetry BMI (Body Mass Index)    76 97.6  F (36.4  C) 16 6' 0.5\" (1.842 m) 100% 23.01 kg/m2       Blood Pressure from Last 3 Encounters:   03/23/17 130/74   01/25/17 129/82   01/23/17 109/70    Weight from Last 3 Encounters:   03/23/17 172 lb (78 kg)   01/25/17 190 lb (86.2 kg)   01/23/17 191 lb (86.6 kg)              We Performed the Following     CBC with platelets     Comprehensive metabolic panel (BMP + Alb, Alk Phos, ALT, AST, Total. Bili, TP)     Lipid Profile with reflex to direct LDL     PSA, screen        Primary Care Provider Office Phone # Fax #    Tj Stover [phone]       [address] [address]        Thank you!     Thank you for choosing [facility]  [place] your [place]. Our goal is always to provide you with excellent care. Hearing back from our patients is one way we can continue to improve our services. Please take a few minutes to complete the written survey that you may receive in the mail after your visit with us. Thank you!             Your " Updated Medication List - Protect others around you: Learn how to safely use, store and throw away your medicines at www.disposemymeds.org.          This list is accurate as of: 3/23/17  3:16 PM.  Always use your most recent med list.                   Brand Name Dispense Instructions for use    calcium carbonate 500 MG chewable tablet    TUMS     Take 1 chew tab by mouth daily as needed       cyclobenzaprine 10 MG tablet    FLEXERIL    90 tablet    Take 1 tablet (10 mg) by mouth 3 times daily as needed for muscle spasms       KLONOPIN PO      Take 0.5 mg by mouth 3 times daily as needed for anxiety       LANsoprazole 15 MG CR capsule    PREVACID     Take 1 capsule by mouth 2 times daily.       meclizine 12.5 MG tablet    ANTIVERT    90 tablet    Take 1-2 tablets (12.5-25 mg) by mouth 3 times daily as needed for dizziness       NEW MED      Tumeric 1.4 bid       PARoxetine 40 MG tablet    PAXIL    90 tablet    Take 1 tablet (40 mg) by mouth daily       psyllium 58.6 % Powd    METAMUCIL     Take 4 teaspoonful by mouth 2 times daily       simvastatin 40 MG tablet    ZOCOR    90 tablet    Take 1 tablet by mouth at  bedtime       traZODone 50 MG tablet    DESYREL    90 tablet    Take 1-2 tablets ( mg) by mouth At Bedtime       VITAMIN D (CHOLECALCIFEROL) PO      Take 5,000 Units by mouth daily       ZANTAC PO      Take 150 mg by mouth daily as needed

## 2017-03-23 NOTE — PROGRESS NOTES
Pt's biometric form filled out and signed by .    Faxed to pt's insurance, copied and abstracted, and original mailed back to pt's home.    Kristina Mahan CMA

## 2017-03-24 ENCOUNTER — TELEPHONE (OUTPATIENT)
Dept: FAMILY MEDICINE | Facility: CLINIC | Age: 62
End: 2017-03-24

## 2017-03-24 ASSESSMENT — ANXIETY QUESTIONNAIRES: GAD7 TOTAL SCORE: 5

## 2017-03-24 ASSESSMENT — PATIENT HEALTH QUESTIONNAIRE - PHQ9: SUM OF ALL RESPONSES TO PHQ QUESTIONS 1-9: 2

## 2017-03-24 NOTE — TELEPHONE ENCOUNTER
[facility] Result Form completed  Faxed [phone]  Original with confirmation mailed to patient  Copy sent to stat abstracting  Avani Toney TC

## 2017-03-30 NOTE — Clinical Note
[facility]    [address] [place] [address]    Phone:  [phone]    Fax:  [phone]       Thank You for choosing us for your health care visit. We are glad to serve you and happy to provide you with this summary of your visit. Please help us to ensure we have accurate records. If you find anything that needs to be changed, please let our staff know as soon as possible.          Your Demographic Information     Patient Name Sex     [place]ag[place]oxy Female 3/30/1925       Ethnic Group Patient Race    Not of  or  Origin White      Your Visit Details     Date & Time Provider Department    3/30/2017 1:10 PM Surya Fraga MD [facility]      Your Upcoming Appointment*(Max 10)     2017 12:00 PM CDT   SN - HOME VISIT with Jadyn Becerril RN   [facility]    [address] [address] [phone]              Conditions Discussed Today or Order-Related Diagnoses        Comments    Diabetic foot infection    -  Primary     Atherosclerosis of coronary artery of native heart without angina pectoris, unspecified vessel or lesion type         Chronic diastolic heart failure         Ambulatory dysfunction         Controlled type 2 diabetes mellitus with stage 4 chronic kidney disease, with long-term current use of insulin           Your Vitals Were     BP Pulse Smoking Status             110/62 (BP Location: [facility], Patient Position: Sitting, Cuff Size: Regular) 72 Never Smoker         Medications Prescribed or Re-Ordered Today     None      Your Current Medications Are        Disp Refills Start End    mupirocin (BACTROBAN) 2 % ointment 22 g 1 3/27/2017 2017    Sig - Route: Apply topically 3 times daily for 10 days. - Topical    Class: Eprescribe    warfarin (COUMADIN) 2.5 MG tablet        Sig - Route: 5 days per week except none on  Biometric  please Wednesdays and Fridays - Oral    Class: Historical Med    furosemide (LASIX) 40 MG tablet        Sig - Route: Take 40 mg by mouth daily. - Oral    Class: Historical Med    ALPRAZolam (XANAX) 0.5 MG tablet 60 tablet 0 3/20/2017     Sig: TAKE ONE TO TWO TABLETS BY MOUTH ONCE DAILY AS NEEDED    Class: Script Not Printed    nystatin (MYCOSTATIN) 048905 UNIT/GM cream 30 g 1 2/13/2017     Sig - Route: Apply topically 2 times daily. For 30 days supply - Topical    Class: Eprescribe    nystatin (MYCOSTATIN) 751719 UNIT/GM powder 60 g 1 2/13/2017     Sig - Route: Apply topically 3 times daily. Under breast area For 30 days supply - Topical    Class: Eprescribe    omeprazole (PRILOSEC) 20 MG capsule 180 capsule 0 2/6/2017     Sig - Route: Take 1 capsule by mouth 2 times daily. - Oral    Class: Eprescribe    losartan (COZAAR) 100 MG tablet 90 tablet 0 2/6/2017     Sig - Route: Take 1 tablet by mouth daily. - Oral    Class: Eprescribe    BD PEN NEEDLE JAQUI U/F 32G X 4 MM Misc 100 each 11 1/17/2017     Sig: USE ONE PEN NEEDLE 4 TIMES DAILY    Class: Eprescribe    LANTUS SOLOSTAR 100 UNIT/ML injection 15 mL 1 1/3/2017     Sig: INJECT 15 UNITS SUBCUTANEOUSLY NIGHTLY    Class: Eprescribe    polyethylene glycol (MIRALAX) powder 527 g 2 12/12/2016     Sig: MIX 17 GRAMS(1 CAPFUL) IN 4-8 OUNCES OF JUICE OR SODA AND DRINK DAILY    Class: Eprescribe    benzocaine/resorcinol (VAGISIL) 5-2 % vaginal cream 60 g 1 10/31/2016     Sig: Bid prn    Class: Eprescribe    potassium chloride 10 MEQ CR tablet 45 tablet 1 10/10/2016     Sig - Route: Take 1 tablet by mouth every other day. - Oral    Class: Eprescribe    metoPROLOL (LOPRESSOR) 100 MG tablet 90 tablet 3 9/12/2016     Sig: Take one half tablet by mouth twice daily    Class: Eprescribe    Insulin Pen Needle (BD PEN NEEDLE JAQUI U/F) 32G X 4 MM Misc 100 Units 3 8/23/2016     Sig: AS DIRECTED    Class: Eprescribe    Insulin Pen Needle (BD PEN NEEDLE JAQUI U/F) 32G X 4 MM Misc 100 Units 0  8/22/2016     Sig: Use as directed.    Class: Eprescribe    ferrous sulfate 325 (65 FE) MG tablet 90 tablet 1 4/12/2016     Sig - Route: Take 1 tablet by mouth daily (with breakfast). - Oral    Class: Eprescribe    triamcinolone (ARISTOCORT) 0.1 % ointment 30 g 2 3/15/2016     Sig: Apply to legs bid prn    Class: Eprescribe    Notes to Pharmacy: Please place on file patient does not need at this time.    HYDROcodone-acetaminophen (NORCO) 5-325 MG per tablet 40 tablet 0 3/25/2017     Sig - Route: Take 1 tablet by mouth every 6 hours as needed (us as directed). - Oral    Notes to Pharmacy: Mailed to [facility]    insulin lispro (HUMALOG KWIKPEN) 100 UNIT/ML injection 15 mL 12 1/11/2016     Sig - Route: Inject 6 Units into the skin Twice daily after meals. - Subcutaneous    Class: Eprescribe      Discontinued Medications        Reason for Discontinue    triazolam (HALCION) 0.25 MG tablet Not Needed    gabapentin (NEURONTIN) 100 MG capsule Not Needed      Allergies     Tramadol RASH    Ciprofloxacin     Numbness, mental change, blood sugar change    Codeine GI UPSET    Nitrofurantoin       Immunizations History as of 3/30/2017     Name Date    Influenza 11/21/2001    Pneumococcal Polysaccharide Adult 6/29/2011      Problem List as of 3/30/2017     Atrial fibrillation    Anemia, unspecified    Anxiety state, unspecified    Coronary atherosclerosis of unspecified type of vessel, native or graft    Chronic diastolic heart failure    CKD stage 3 due to type 2 diabetes mellitus    DM II (diabetes mellitus, type II), controlled    Gout, unspecified    Essential hypertension, benign    HLD (hyperlipidemia)    Insomnia    Obesity, unspecified    Osteoarthritis    Peripheral vascular disease, unspecified    Diastolic dysfunction    Cerebral microvascular disease    Cerebral atrophy    Controlled type 2 diabetes mellitus with stage 4 chronic kidney disease, with long-term current use of insulin              Patient  Instructions       At [facility], one important tool we use to improve our patient services is our Patient Survey.  Following your visit you may receive our survey in the mail.    Please take the time to complete the survey.    If your visit with us was great, we want to hear about it.    If we can improve, please let us know how.

## 2017-04-10 DIAGNOSIS — E78.5 HYPERLIPIDEMIA LDL GOAL <130: ICD-10-CM

## 2017-04-11 NOTE — TELEPHONE ENCOUNTER
Zocor  Last Written Prescription Date: 3/15/16  Last Fill Quantity: 90, # refills: 3  Last Office Visit with [facility] [place] or [facility] prescribing provider: 3/23/17       Lab Results   Component Value Date    CHOL 164 03/23/2017     Lab Results   Component Value Date    HDL 60 03/23/2017     Lab Results   Component Value Date    LDL 87 03/23/2017     Lab Results   Component Value Date    TRIG 83 03/23/2017     Lab Results   Component Value Date    CHOLHDLRATIO 2.6 03/05/2015       ISABELLA Mendiola LPN

## 2017-04-12 RX ORDER — SIMVASTATIN 40 MG
TABLET ORAL
Qty: 90 TABLET | Refills: 3 | Status: SHIPPED | OUTPATIENT
Start: 2017-04-12 | End: 2018-04-02

## 2017-04-12 NOTE — TELEPHONE ENCOUNTER
Prescription approved per [facility], [facility] or [facility] refill protocol.  Phoebe Muñoz RN  [facility]riage [employer]

## 2017-12-08 DIAGNOSIS — G47.00 INSOMNIA: ICD-10-CM

## 2017-12-08 DIAGNOSIS — G47.00 INSOMNIA, UNSPECIFIED TYPE: ICD-10-CM

## 2017-12-08 DIAGNOSIS — F32.9 DEPRESSION, MAJOR: ICD-10-CM

## 2017-12-08 DIAGNOSIS — F33.41 RECURRENT MAJOR DEPRESSIVE DISORDER, IN PARTIAL REMISSION (H): Primary | ICD-10-CM

## 2017-12-08 NOTE — TELEPHONE ENCOUNTER
Requested Prescriptions   Pending Prescriptions Disp Refills     PARoxetine (PAXIL) 40 MG tablet  Last Written Prescription Date:  12/18/2014  Last Fill Quantity: 90 tablet,  # refills: 1   Last Office Visit with [facility] or [facility] prescribing provider:  3/23/17   Future Office Visit:      90 tablet 1     Sig: Take 1 tablet (40 mg) by mouth daily    SSRIs Protocol Failed    12/8/2017  1:09 PM       Failed - PHQ-9 score less than 5 in past 6 months    Please review PHQ-9 score.   PHQ-9 SCORE 3/2/2015 1/27/2016 3/23/2017   Total Score - - -   Total Score MyChart 10 - -   Total Score - 1 2     ANCA-7 SCORE 12/18/2014 1/27/2016 3/23/2017   Total Score 7 - -   Total Score - 4 5          Failed - Recent (6 mo) or future visit with authorizing provider's specialty    Patient had office visit in the last 6 months or has a visit in the next 30 days with authorizing provider.  See chart review.          Passed - Medication is NOT Bupropion    If the medication is Bupropion (Wellbutrin), and the patient is taking for smoking cessation; OK to refill.         Passed - Patient is age 18 or older        traZODone (DESYREL) 50 MG tablet  Last Written Prescription Date:  3/5/2015  Last Fill Quantity: 90 tablet,  # refills: 1   Last Office Visit with [facility], [facility] or [facility] prescribing provider:  3/23/17   Future Office Visit:      90 tablet 1     Sig: Take 1-2 tablets ( mg) by mouth At Bedtime    Serotonin Modulators Passed    12/8/2017  1:09 PM       Passed - Recent or future visit with authorizing provider's specialty    Patient had office visit in the last year or has a visit in the next 30 days with authorizing provider.  See chart review.          Passed - Patient is age 18 or older

## 2017-12-11 RX ORDER — TRAZODONE HYDROCHLORIDE 50 MG/1
50-100 TABLET, FILM COATED ORAL AT BEDTIME
Qty: 90 TABLET | Refills: 1 | Status: SHIPPED | OUTPATIENT
Start: 2017-12-11 | End: 2018-10-12

## 2017-12-11 RX ORDER — PAROXETINE 40 MG/1
40 TABLET, FILM COATED ORAL DAILY
Qty: 90 TABLET | Refills: 1 | Status: SHIPPED | OUTPATIENT
Start: 2017-12-11 | End: 2017-12-18 | Stop reason: DRUGHIGH

## 2017-12-11 ASSESSMENT — PATIENT HEALTH QUESTIONNAIRE - PHQ9: SUM OF ALL RESPONSES TO PHQ QUESTIONS 1-9: 0

## 2017-12-11 NOTE — TELEPHONE ENCOUNTER
Left non detailed message for patient to return call. Meds have not been prescribed for 3+ years. Need more information.   Kayleigh Hargrove RN   [facility] - Triage

## 2017-12-11 NOTE — TELEPHONE ENCOUNTER
Patient called and states that his therapist has retired and received a letter indicating that he should have medication refills done by his primary care provider.  Patient states that he has been on this dose and denies any lapse in his medication.  Patient updated PHQ-9 and has no concerns.    PHQ-9 SCORE 1/27/2016 3/23/2017 12/11/2017   Total Score - - -   Total Score MyChart - - -   Total Score 1 2 0       Please advise to refill request,  Phoebe Muñoz RN - Triage  [facility]

## 2017-12-18 ENCOUNTER — OFFICE VISIT (OUTPATIENT)
Dept: FAMILY MEDICINE | Facility: CLINIC | Age: 62
End: 2017-12-18
Payer: COMMERCIAL

## 2017-12-18 VITALS
WEIGHT: 179 LBS | TEMPERATURE: 98 F | HEART RATE: 80 BPM | RESPIRATION RATE: 16 BRPM | SYSTOLIC BLOOD PRESSURE: 118 MMHG | BODY MASS INDEX: 23.72 KG/M2 | HEIGHT: 73 IN | DIASTOLIC BLOOD PRESSURE: 74 MMHG | OXYGEN SATURATION: 99 %

## 2017-12-18 DIAGNOSIS — F33.1 MODERATE EPISODE OF RECURRENT MAJOR DEPRESSIVE DISORDER (H): Primary | ICD-10-CM

## 2017-12-18 DIAGNOSIS — F41.9 ANXIETY: ICD-10-CM

## 2017-12-18 DIAGNOSIS — Z23 NEED FOR PROPHYLACTIC VACCINATION AND INOCULATION AGAINST INFLUENZA: ICD-10-CM

## 2017-12-18 PROCEDURE — G0008 ADMIN INFLUENZA VIRUS VAC: HCPCS | Performed by: FAMILY MEDICINE

## 2017-12-18 PROCEDURE — 99213 OFFICE O/P EST LOW 20 MIN: CPT | Performed by: FAMILY MEDICINE

## 2017-12-18 PROCEDURE — 90686 IIV4 VACC NO PRSV 0.5 ML IM: CPT | Performed by: FAMILY MEDICINE

## 2017-12-18 RX ORDER — CHLORAL HYDRATE 500 MG
2 CAPSULE ORAL 2 TIMES DAILY
COMMUNITY
End: 2019-04-03

## 2017-12-18 RX ORDER — PAROXETINE 20 MG/1
20 TABLET, FILM COATED ORAL DAILY
Qty: 90 TABLET | Refills: 3 | Status: SHIPPED | OUTPATIENT
Start: 2017-12-18 | End: 2019-02-18

## 2017-12-18 RX ORDER — CLONAZEPAM 0.5 MG/1
0.5 TABLET ORAL 3 TIMES DAILY PRN
Qty: 270 TABLET | Refills: 1 | Status: SHIPPED | OUTPATIENT
Start: 2017-12-18 | End: 2019-04-03

## 2017-12-18 NOTE — MR AVS SNAPSHOT
"              After Visit Summary   12/18/2017    [place]    MRN: [id number]           Patient Information     Date Of Birth          1955        Visit Information        Provider Department      12/18/2017 3:20 PM Tj Stover MD [facility] [place]        Today's Diagnoses     Moderate episode of recurrent major depressive disorder (H)    -  1    Anxiety           Follow-ups after your visit        Follow-up notes from your care team     Return in about 6 months (around 6/18/2018).      Who to contact     If you have questions or need follow up information about today's clinic visit or your schedule please contact [facility] directly at [phone].  Normal or non-critical lab and imaging results will be communicated to you by MyChart, letter or phone within 4 business days after the clinic has received the results. If you do not hear from us within 7 days, please contact the clinic through [employer] or phone. If you have a critical or abnormal lab result, we will notify you by phone as soon as possible.  Submit refill requests through [employer] or call your pharmacy and they will forward the refill request to us. Please allow 3 business days for your refill to be completed.          Additional Information About Your Visit        MyChart Information     [employer] gives you secure access to your electronic health record. If you see a primary care provider, you can also send messages to your care team and make appointments. If you have questions, please call your primary care clinic.  If you do not have a primary care provider, please call [phone] and they will assist you.        Care EveryWhere ID     This is your Care EveryWhere ID. This could be used by other organizations to access your [place] medical records  [id number]        Your Vitals Were     Pulse Temperature Respirations Height Pulse Oximetry BMI (Body Mass Index)    80 98  F (36.7  C) 16 6' 0.5\" (1.842 m) " 99% 23.94 kg/m2       Blood Pressure from Last 3 Encounters:   12/18/17 118/74   03/23/17 130/74   01/25/17 129/82    Weight from Last 3 Encounters:   12/18/17 179 lb (81.2 kg)   03/23/17 172 lb (78 kg)   01/25/17 190 lb (86.2 kg)              Today, you had the following     No orders found for display         Today's Medication Changes          These changes are accurate as of: 12/18/17  4:09 PM.  If you have any questions, ask your nurse or doctor.               These medicines have changed or have updated prescriptions.        Dose/Directions    clonazePAM 0.5 MG tablet   Commonly known as:  klonoPIN   This may have changed:  medication strength   Used for:  Moderate episode of recurrent major depressive disorder (H), Anxiety   Changed by:  Tj Stover MD        Dose:  0.5 mg   Take 1 tablet (0.5 mg) by mouth 3 times daily as needed for anxiety   Quantity:  270 tablet   Refills:  1       PARoxetine 20 MG tablet   Commonly known as:  PAXIL   This may have changed:    - medication strength  - Another medication with the same name was removed. Continue taking this medication, and follow the directions you see here.   Used for:  Moderate episode of recurrent major depressive disorder (H), Anxiety   Changed by:  Tj Stover MD        Dose:  20 mg   Take 1 tablet (20 mg) by mouth daily   Quantity:  90 tablet   Refills:  3            Where to get your medicines      These medications were sent to [facility] - [address], [facility] [address]     Phone:  [phone]     PARoxetine 20 MG tablet         Some of these will need a paper prescription and others can be bought over the counter.  Ask your nurse if you have questions.     Bring a paper prescription for each of these medications     clonazePAM 0.5 MG tablet                Primary Care Provider Office Phone # Fax #    Tj Stover [phone]       [address] [place] [address]  DRIVE  [place] [address]        Equal Access to Services     SAVITA ROMAN : Hadii aad ku hadmelindabryson Oscar, waaxda davidadaha, qaybta ahmetmamegan dimas. So [facility] [phone].    ATENCIÓN: Si habla español, tiene a charles disposición servicios gratuitos de asistencia lingüística. Llame al [phone].    We comply with applicable federal civil rights laws and [place] laws. We do not discriminate on the basis of race, color, national origin, age, disability, sex, sexual orientation, or gender identity.            Thank you!     Thank you for choosing [facility] [place]  for your care. Our goal is always to provide you with excellent care. Hearing back from our patients is one way we can continue to improve our services. Please take a few minutes to complete the written survey that you may receive in the mail after your visit with us. Thank you!             Your Updated Medication List - Protect others around you: Learn how to safely use, store and throw away your medicines at www.disposemymeds.org.          This list is accurate as of: 12/18/17  4:09 PM.  Always use your most recent med list.                   Brand Name Dispense Instructions for use Diagnosis    calcium carbonate 500 MG chewable tablet    TUMS     Take 1 chew tab by mouth daily as needed        clonazePAM 0.5 MG tablet    klonoPIN    270 tablet    Take 1 tablet (0.5 mg) by mouth 3 times daily as needed for anxiety    Moderate episode of recurrent major depressive disorder (H), Anxiety       cyclobenzaprine 10 MG tablet    FLEXERIL    90 tablet    Take 1 tablet (10 mg) by mouth 3 times daily as needed for muscle spasms    Dorsal back pain       fish oil-omega-3 fatty acids 1000 MG capsule      Take 2 g by mouth 2 times daily        MAGNESIUM OXIDE PO      Take 400 mg by mouth 2 times daily        PARoxetine 20 MG tablet    PAXIL    90 tablet    Take 1 tablet (20 mg) by mouth daily    Moderate  episode of recurrent major depressive disorder (H), Anxiety       psyllium 58.6 % Powd    METAMUCIL     Take 4 teaspoonful by mouth 2 times daily        simvastatin 40 MG tablet    ZOCOR    90 tablet    Take 1 tablet by mouth at  bedtime    Hyperlipidemia LDL goal <130       traZODone 50 MG tablet    DESYREL    90 tablet    Take 1-2 tablets ( mg) by mouth At Bedtime    Recurrent major depressive disorder, in partial remission (H), Insomnia, unspecified type       VITAMIN D (CHOLECALCIFEROL) PO      Take 5,000 Units by mouth daily        ZANTAC PO      Take 150 mg by mouth daily as needed

## 2017-12-18 NOTE — PROGRESS NOTES
"Chief Complaint   Patient presents with     Recheck Medication       Initial /74  Pulse 80  Temp 98  F (36.7  C)  Resp 16  Ht 6' 0.5\" (1.842 m)  Wt 179 lb (81.2 kg)  SpO2 99%  BMI 23.94 kg/m2 Estimated body mass index is 23.94 kg/(m^2) as calculated from the following:    Height as of this encounter: 6' 0.5\" (1.842 m).    Weight as of this encounter: 179 lb (81.2 kg).  Medication Reconciliation: complete. ISABELLA Mendiola LPN          SUBJECTIVE:   Hawk Nation is a 62 year old male who presents to clinic today for the following health issues:      Medication Followup of Klonopin    Taking Medication as prescribed: yes    Side Effects:  None    Medication Helping Symptoms:  yes     Problem list and histories reviewed & adjusted, as indicated.  Additional history: as documented    Patient Active Problem List   Diagnosis     Depression, major     Erectile dysfunction     GERD (gastroesophageal reflux disease)     Meniere's disease     BPH (benign prostatic hyperplasia)     Incontinence of urine     Hyperlipidemia LDL goal <130     Advanced directives, counseling/discussion     Back pain, chronic     Humerus fracture     Past Surgical History:   Procedure Laterality Date     C NONSPECIFIC PROCEDURE  10/15/10    GreenLight Laser Ablation of Prostate     ENT SURGERY      deviated septum at age 15      SURGICAL HISTORY OF -       rectal fistula      SURGICAL HISTORY OF -   1/2012    [place]: external sphincter for incontinence       Social History   Substance Use Topics     Smoking status: Former Smoker     Quit date: 1/1/1981     Smokeless tobacco: Former User      Comment: 10 pack year history      Alcohol use 0.0 oz/week     0 Standard drinks or equivalent per week      Comment: 12pack per month     Family History   Problem Relation Age of Onset     Depression Sister      Depression Mother      HEART DISEASE Mother      CAD      Lipids Mother      Depression Father      Alzheimer Disease Father      DIABETES " Father      Hypertension Father      Lipids Maternal Grandmother      Lipids Maternal Grandfather      C.A.D. Sister      Asthma No family hx of      Cancer - colorectal No family hx of      Prostate Cancer No family hx of      Eye Disorder No family hx of      Thyroid Disease No family hx of          Current Outpatient Prescriptions   Medication Sig Dispense Refill     MAGNESIUM OXIDE PO Take 400 mg by mouth 2 times daily       fish oil-omega-3 fatty acids 1000 MG capsule Take 2 g by mouth 2 times daily       clonazePAM (KLONOPIN) 0.5 MG tablet Take 1 tablet (0.5 mg) by mouth 3 times daily as needed for anxiety 270 tablet 1     PARoxetine (PAXIL) 20 MG tablet Take 1 tablet (20 mg) by mouth daily 90 tablet 3     traZODone (DESYREL) 50 MG tablet Take 1-2 tablets ( mg) by mouth At Bedtime 90 tablet 1     simvastatin (ZOCOR) 40 MG tablet Take 1 tablet by mouth at  bedtime 90 tablet 3     VITAMIN D, CHOLECALCIFEROL, PO Take 5,000 Units by mouth daily       cyclobenzaprine (FLEXERIL) 10 MG tablet Take 1 tablet (10 mg) by mouth 3 times daily as needed for muscle spasms 90 tablet 1     psyllium (METAMUCIL) 58.6 % POWD Take 4 teaspoonful by mouth 2 times daily       Ranitidine HCl (ZANTAC PO) Take 150 mg by mouth daily as needed       calcium carbonate (TUMS) 500 MG chewable tablet Take 1 chew tab by mouth daily as needed       [DISCONTINUED] PARoxetine HCl (PAXIL PO) Take 20 mg by mouth daily       [DISCONTINUED] PARoxetine (PAXIL) 40 MG tablet Take 1 tablet (40 mg) by mouth daily (Patient not taking: Reported on 12/18/2017) 90 tablet 1     [DISCONTINUED] ClonazePAM (KLONOPIN PO) Take 0.5 mg by mouth 3 times daily as needed for anxiety       Allergies   Allergen Reactions     Nkda [No Known Drug Allergies]      Recent Labs   Lab Test  03/23/17   0944  01/25/17   1144  03/07/16   0853  03/05/15   0859  03/04/13   A1C   --    --    --    --    --   5.7   LDL  87   --   115*  90   < >  115   HDL  60   --   56  65   < >   "59   TRIG  83   --   70  76   < >  61   ALT  19  27  28  26   < >   --    CR  1.12  1.23  1.02  1.00   < >   --    GFRESTIMATED  66  59*  74  76   < >   --    GFRESTBLACK  80  72  89  >90   GFR Calc     < >   --    POTASSIUM  3.9  3.8  4.0  4.1   < >   --     < > = values in this interval not displayed.      BP Readings from Last 3 Encounters:   12/18/17 118/74   03/23/17 130/74   01/25/17 129/82    Wt Readings from Last 3 Encounters:   12/18/17 179 lb (81.2 kg)   03/23/17 172 lb (78 kg)   01/25/17 190 lb (86.2 kg)              Reviewed and updated as needed this visit by clinical staffTobacco  Allergies  Meds  Fam Hx  Soc Hx      Reviewed and updated as needed this visit by Provider         ROS:  Constitutional, HEENT, cardiovascular, pulmonary, gi and gu systems are negative, except as otherwise noted.      OBJECTIVE:   /74  Pulse 80  Temp 98  F (36.7  C)  Resp 16  Ht 6' 0.5\" (1.842 m)  Wt 179 lb (81.2 kg)  SpO2 99%  BMI 23.94 kg/m2  Body mass index is 23.94 kg/(m^2).  GENERAL: healthy, alert and no distress  NECK: no adenopathy, no asymmetry, masses, or scars and thyroid normal to palpation  RESP: lungs clear to auscultation - no rales, rhonchi or wheezes  CV: regular rate and rhythm, normal S1 S2, no S3 or S4, no murmur, click or rub, no peripheral edema and peripheral pulses strong  ABDOMEN: soft, nontender, no hepatosplenomegaly, no masses and bowel sounds normal  MS: no gross musculoskeletal defects noted, no edema        ASSESSMENT/PLAN:   Hawk was seen today for recheck medication.    Diagnoses and all orders for this visit:    Moderate episode of recurrent major depressive disorder (H)  -     clonazePAM (KLONOPIN) 0.5 MG tablet; Take 1 tablet (0.5 mg) by mouth 3 times daily as needed for anxiety  -     PARoxetine (PAXIL) 20 MG tablet; Take 1 tablet (20 mg) by mouth daily    Anxiety  -     clonazePAM (KLONOPIN) 0.5 MG tablet; Take 1 tablet (0.5 mg) by mouth 3 times daily " as needed for anxiety  -     PARoxetine (PAXIL) 20 MG tablet; Take 1 tablet (20 mg) by mouth daily      Has been stable with current dose of meds, has no side effect from it, his mental health provider retired, will take over the current sx treatment with medicine without change the dosage       FUTURE APPOINTMENTS:       - Follow-up visit in 6 months for med check     A total time of 27minutes was spent with the patient today. Of this time More than 50% was spent counseling and coordinating of care of the above concerns.      Tj Stover MD  [facility] [place]

## 2017-12-18 NOTE — PROGRESS NOTES

## 2018-01-25 ENCOUNTER — TRANSFERRED RECORDS (OUTPATIENT)
Dept: FAMILY MEDICINE | Facility: CLINIC | Age: 63
End: 2018-01-25

## 2018-01-25 NOTE — PROGRESS NOTES
Records received from [facility] and given to Dr Stover for review.  Doris Pineda,

## 2018-04-02 ENCOUNTER — OFFICE VISIT (OUTPATIENT)
Dept: FAMILY MEDICINE | Facility: CLINIC | Age: 63
End: 2018-04-02
Payer: COMMERCIAL

## 2018-04-02 VITALS
DIASTOLIC BLOOD PRESSURE: 87 MMHG | TEMPERATURE: 97.2 F | WEIGHT: 175 LBS | RESPIRATION RATE: 14 BRPM | HEIGHT: 72 IN | SYSTOLIC BLOOD PRESSURE: 129 MMHG | OXYGEN SATURATION: 100 % | BODY MASS INDEX: 23.7 KG/M2 | HEART RATE: 64 BPM

## 2018-04-02 DIAGNOSIS — E78.5 HYPERLIPIDEMIA LDL GOAL <130: ICD-10-CM

## 2018-04-02 DIAGNOSIS — Z12.5 SCREENING FOR PROSTATE CANCER: ICD-10-CM

## 2018-04-02 DIAGNOSIS — Z00.00 HEALTH CARE MAINTENANCE: Primary | ICD-10-CM

## 2018-04-02 DIAGNOSIS — M54.9 DORSAL BACK PAIN: ICD-10-CM

## 2018-04-02 DIAGNOSIS — Z11.59 NEED FOR HEPATITIS C SCREENING TEST: ICD-10-CM

## 2018-04-02 DIAGNOSIS — R42 VERTIGO: ICD-10-CM

## 2018-04-02 LAB
ERYTHROCYTE [DISTWIDTH] IN BLOOD BY AUTOMATED COUNT: 13.7 % (ref 10–15)
HCT VFR BLD AUTO: 44.6 % (ref 40–53)
HGB BLD-MCNC: 14.5 G/DL (ref 13.3–17.7)
MCH RBC QN AUTO: 29.9 PG (ref 26.5–33)
MCHC RBC AUTO-ENTMCNC: 32.5 G/DL (ref 31.5–36.5)
MCV RBC AUTO: 92 FL (ref 78–100)
PLATELET # BLD AUTO: 205 10E9/L (ref 150–450)
RBC # BLD AUTO: 4.85 10E12/L (ref 4.4–5.9)
WBC # BLD AUTO: 6 10E9/L (ref 4–11)

## 2018-04-02 PROCEDURE — 99396 PREV VISIT EST AGE 40-64: CPT | Performed by: FAMILY MEDICINE

## 2018-04-02 PROCEDURE — 80061 LIPID PANEL: CPT | Performed by: FAMILY MEDICINE

## 2018-04-02 PROCEDURE — 36415 COLL VENOUS BLD VENIPUNCTURE: CPT | Performed by: FAMILY MEDICINE

## 2018-04-02 PROCEDURE — G0103 PSA SCREENING: HCPCS | Performed by: FAMILY MEDICINE

## 2018-04-02 PROCEDURE — 86803 HEPATITIS C AB TEST: CPT | Performed by: FAMILY MEDICINE

## 2018-04-02 PROCEDURE — 85027 COMPLETE CBC AUTOMATED: CPT | Performed by: FAMILY MEDICINE

## 2018-04-02 PROCEDURE — 80053 COMPREHEN METABOLIC PANEL: CPT | Performed by: FAMILY MEDICINE

## 2018-04-02 RX ORDER — MECLIZINE HYDROCHLORIDE 25 MG/1
25 TABLET ORAL EVERY 6 HOURS PRN
Qty: 30 TABLET | Refills: 1 | Status: SHIPPED | OUTPATIENT
Start: 2018-04-02 | End: 2019-04-17

## 2018-04-02 RX ORDER — SIMVASTATIN 40 MG
TABLET ORAL
Qty: 90 TABLET | Refills: 3 | Status: SHIPPED | OUTPATIENT
Start: 2018-04-02 | End: 2019-04-03

## 2018-04-02 RX ORDER — CYCLOBENZAPRINE HCL 10 MG
10 TABLET ORAL 3 TIMES DAILY PRN
Qty: 30 TABLET | Refills: 1 | Status: SHIPPED | OUTPATIENT
Start: 2018-04-02

## 2018-04-02 ASSESSMENT — ANXIETY QUESTIONNAIRES
3. WORRYING TOO MUCH ABOUT DIFFERENT THINGS: NOT AT ALL
GAD7 TOTAL SCORE: 2
5. BEING SO RESTLESS THAT IT IS HARD TO SIT STILL: NOT AT ALL
IF YOU CHECKED OFF ANY PROBLEMS ON THIS QUESTIONNAIRE, HOW DIFFICULT HAVE THESE PROBLEMS MADE IT FOR YOU TO DO YOUR WORK, TAKE CARE OF THINGS AT HOME, OR GET ALONG WITH OTHER PEOPLE: NOT DIFFICULT AT ALL
6. BECOMING EASILY ANNOYED OR IRRITABLE: SEVERAL DAYS
2. NOT BEING ABLE TO STOP OR CONTROL WORRYING: NOT AT ALL
1. FEELING NERVOUS, ANXIOUS, OR ON EDGE: NOT AT ALL
7. FEELING AFRAID AS IF SOMETHING AWFUL MIGHT HAPPEN: NOT AT ALL

## 2018-04-02 ASSESSMENT — PATIENT HEALTH QUESTIONNAIRE - PHQ9: 5. POOR APPETITE OR OVEREATING: SEVERAL DAYS

## 2018-04-02 NOTE — MR AVS SNAPSHOT
After Visit Summary   4/2/2018    Hawk Nation    MRN: [id number]           Patient Information     Date Of Birth          1955        Visit Information        Provider Department      4/2/2018 11:20 AM [facility]        Today's Diagnoses     Health care maintenance    -  1    Screening for prostate cancer        Need for hepatitis C screening test        Hyperlipidemia LDL goal <130        Vertigo        Dorsal back pain           Follow-ups after your visit        Follow-up notes from your care team     Return in about 1 year (around 4/2/2019) for Physical Exam.      Who to contact     If you have questions or need follow up information about today's clinic visit or your schedule please contact [facility] directly at [phone].  Normal or non-critical lab and imaging results will be communicated to you by MyChart, letter or phone within 4 business days after the clinic has received the results. If you do not hear from us within 7 days, please contact the clinic through MyChart or phone. If you have a critical or abnormal lab result, we will notify you by phone as soon as possible.  Submit refill requests through [employer] or call your pharmacy and they will forward the refill request to us. Please allow 3 business days for your refill to be completed.          Additional Information About Your Visit        MyChart Information     [employer] gives you secure access to your electronic health record. If you see a primary care provider, you can also send messages to your care team and make appointments. If you have questions, please call your primary care clinic.  If you do not have a primary care provider, please call [phone] and they will assist you.        Care EveryWhere ID     This is your Care EveryWhere ID. This could be used by other organizations to access your [place] medical records  [id number]        Your Vitals Were      "Pulse Temperature Respirations Height Pulse Oximetry BMI (Body Mass Index)    64 97.2  F (36.2  C) (Tympanic) 14 6' 0.25\" (1.835 m) 100% 23.57 kg/m2       Blood Pressure from Last 3 Encounters:   04/02/18 129/87   12/18/17 118/74   03/23/17 130/74    Weight from Last 3 Encounters:   04/02/18 175 lb (79.4 kg)   12/18/17 179 lb (81.2 kg)   03/23/17 172 lb (78 kg)              We Performed the Following     **Hepatitis C Screen Reflex to RNA FUTURE anytime     CBC with platelets     Comprehensive metabolic panel     Lipid panel reflex to direct LDL Fasting     PSA, screen          Today's Medication Changes          These changes are accurate as of 4/2/18 11:59 PM.  If you have any questions, ask your nurse or doctor.               Start taking these medicines.        Dose/Directions    meclizine 25 MG tablet   Commonly known as:  ANTIVERT   Used for:  Vertigo   Started by:  Tj Stover MD        Dose:  25 mg   Take 1 tablet (25 mg) by mouth every 6 hours as needed for dizziness   Quantity:  30 tablet   Refills:  1         These medicines have changed or have updated prescriptions.        Dose/Directions    simvastatin 40 MG tablet   Commonly known as:  ZOCOR   This may have changed:  See the new instructions.   Used for:  Hyperlipidemia LDL goal <130   Changed by:  Tj Stover MD        Take 1 tablet by mouth at  bedtime   Quantity:  90 tablet   Refills:  3            Where to get your medicines      These medications were sent to [facility] - [address], [facility] [address]     Phone:  [phone]     simvastatin 40 MG tablet         These medications were sent to [facility] [address] [address] AT [place] [address] & [address] [address]     Phone:  [phone]     cyclobenzaprine 10 MG tablet    meclizine 25 MG tablet                Primary Care Provider Office Phone # Fax #    " Tj Stover -[phone]       [address] [place] [address]        Equal Access to Services     SAVITA ROMAN : Tangela Oscar, walizzie mcmahon, maria del carmen kaalvinda mihailalit, waxcorey lashaunin hayaakeely holmgeovanny maribethtrevinapple triana. So [facility] [phone].    ATENCIÓN: Si habla español, tiene a charles disposición servicios gratuitos de asistencia lingüística. Llame al [phone].    We comply with applicable federal civil rights laws and [place] laws. We do not discriminate on the basis of race, color, national origin, age, disability, sex, sexual orientation, or gender identity.            Thank you!     [facility]  [facility]. Our goal is always to provide you with excellent care. Hearing back from our patients is one way we can continue to improve our services. Please take a few minutes to complete the written survey that you may receive in the mail after your visit with us. Thank you!             Your Updated Medication List - Protect others around you: Learn how to safely use, store and throw away your medicines at www.disposemymeds.org.          This list is accurate as of 4/2/18 11:59 PM.  Always use your most recent med list.                   Brand Name Dispense Instructions for use Diagnosis    ASPIRIN 81 PO      Take by mouth daily        calcium carbonate 500 MG chewable tablet    TUMS     Take 1 chew tab by mouth daily as needed        clonazePAM 0.5 MG tablet    klonoPIN    270 tablet    Take 1 tablet (0.5 mg) by mouth 3 times daily as needed for anxiety    Moderate episode of recurrent major depressive disorder (H), Anxiety       cyclobenzaprine 10 MG tablet    FLEXERIL    30 tablet    Take 1 tablet (10 mg) by mouth 3 times daily as needed for muscle spasms    Dorsal back pain       fish oil-omega-3 fatty acids 1000 MG capsule      Take 2 g by mouth 2 times daily        IRON SUPPLEMENT PO      Take 65 mg by mouth daily         MAGNESIUM OXIDE PO      Take 400 mg by mouth 2 times daily        meclizine 25 MG tablet    ANTIVERT    30 tablet    Take 1 tablet (25 mg) by mouth every 6 hours as needed for dizziness    Vertigo       PARoxetine 20 MG tablet    PAXIL    90 tablet    Take 1 tablet (20 mg) by mouth daily    Moderate episode of recurrent major depressive disorder (H), Anxiety       psyllium 58.6 % Powd    METAMUCIL     Take 4 teaspoonful by mouth 2 times daily        simvastatin 40 MG tablet    ZOCOR    90 tablet    Take 1 tablet by mouth at  bedtime    Hyperlipidemia LDL goal <130       traZODone 50 MG tablet    DESYREL    90 tablet    Take 1-2 tablets ( mg) by mouth At Bedtime    Recurrent major depressive disorder, in partial remission (H), Insomnia, unspecified type       vitamin B complex with vitamin C Tabs tablet      Take 1 tablet by mouth daily        VITAMIN D (CHOLECALCIFEROL) PO      Take 5,000 Units by mouth daily        ZANTAC PO      Take 150 mg by mouth daily as needed

## 2018-04-02 NOTE — NURSING NOTE
"Chief Complaint   Patient presents with     Physical       Initial /87 (Cuff Size: Adult Large)  Pulse 64  Temp 97.2  F (36.2  C) (Tympanic)  Resp 14  Ht 6' 0.25\" (1.835 m)  Wt 175 lb (79.4 kg)  SpO2 100%  BMI 23.57 kg/m2 Estimated body mass index is 23.57 kg/(m^2) as calculated from the following:    Height as of this encounter: 6' 0.25\" (1.835 m).    Weight as of this encounter: 175 lb (79.4 kg).  Medication Reconciliation: complete   Doretha Dias, CMA    "

## 2018-04-02 NOTE — PROGRESS NOTES
SUBJECTIVE:   CC: Hawk Nation is an 62 year old male who presents for preventative health visit.     Physical   Annual:     Getting at least 3 servings of Calcium per day::  Yes    Bi-annual eye exam::  Yes    Dental care twice a year::  Yes    Sleep apnea or symptoms of sleep apnea::  None    Diet::  Regular (no restrictions)    Frequency of exercise::  4-5 days/week    Duration of exercise::  45-60 minutes    Taking medications regularly::  Yes    Medication side effects::  None    Additional concerns today::  No            Today's PHQ-2 Score:   PHQ-2 ( 1999 [employer]) 3/30/2018   Q1: Little interest or pleasure in doing things 0   Q2: Feeling down, depressed or hopeless 0   PHQ-2 Score 0   Q1: Little interest or pleasure in doing things Not at all   Q2: Feeling down, depressed or hopeless Not at all   PHQ-2 Score 0       Abuse: Current or Past(Physical, Sexual or Emotional)- No  Do you feel safe in your environment - Yes    Social History   Substance Use Topics     Smoking status: Former Smoker     Quit date: 1/1/1981     Smokeless tobacco: Former User      Comment: 10 pack year history      Alcohol use 0.0 oz/week     0 Standard drinks or equivalent per week      Comment: 12pack per month     Alcohol Use 3/30/2018   If you drink alcohol do you typically have greater than 3 drinks per day OR greater than 7 drinks per week? No   No flowsheet data found.    Last PSA:   PSA   Date Value Ref Range Status   03/23/2017 1.94 0 - 4 ug/L Final     Comment:     Assay Method:  Chemiluminescence using Siemens Vista analyzer       Reviewed orders with patient. Reviewed health maintenance and updated orders accordingly - Yes  BP Readings from Last 3 Encounters:   04/02/18 129/87   12/18/17 118/74   03/23/17 130/74    Wt Readings from Last 3 Encounters:   04/02/18 175 lb (79.4 kg)   12/18/17 179 lb (81.2 kg)   03/23/17 172 lb (78 kg)                  Patient Active Problem List   Diagnosis     Depression, major      Erectile dysfunction     GERD (gastroesophageal reflux disease)     Meniere's disease     BPH (benign prostatic hyperplasia)     Incontinence of urine     Hyperlipidemia LDL goal <130     Advanced directives, counseling/discussion     Back pain, chronic     Humerus fracture     Past Surgical History:   Procedure Laterality Date     C NONSPECIFIC PROCEDURE  10/15/10    GreenLight Laser Ablation of Prostate     ENT SURGERY      deviated septum at age 15      SURGICAL HISTORY OF -       rectal fistula      SURGICAL HISTORY OF -   1/2012    [place]: external sphincter for incontinence       Social History   Substance Use Topics     Smoking status: Former Smoker     Quit date: 1/1/1981     Smokeless tobacco: Former User      Comment: 10 pack year history      Alcohol use 0.0 oz/week     0 Standard drinks or equivalent per week      Comment: 12pack per month     Family History   Problem Relation Age of Onset     Depression Sister      Depression Mother      HEART DISEASE Mother      CAD      Lipids Mother      Depression Father      Alzheimer Disease Father      DIABETES Father      Hypertension Father      Lipids Maternal Grandmother      Lipids Maternal Grandfather      C.A.D. Sister      Asthma No family hx of      Cancer - colorectal No family hx of      Prostate Cancer No family hx of      Eye Disorder No family hx of      Thyroid Disease No family hx of          Current Outpatient Prescriptions   Medication Sig Dispense Refill     ASPIRIN 81 PO Take by mouth daily       vitamin B complex with vitamin C (VITAMIN  B COMPLEX) TABS tablet Take 1 tablet by mouth daily       Ferrous Sulfate (IRON SUPPLEMENT PO) Take 65 mg by mouth daily       meclizine (ANTIVERT) 25 MG tablet Take 1 tablet (25 mg) by mouth every 6 hours as needed for dizziness 30 tablet 1     cyclobenzaprine (FLEXERIL) 10 MG tablet Take 1 tablet (10 mg) by mouth 3 times daily as needed for muscle spasms 30 tablet 1     simvastatin (ZOCOR) 40 MG tablet Take 1  tablet by mouth at  bedtime 90 tablet 3     MAGNESIUM OXIDE PO Take 400 mg by mouth 2 times daily       fish oil-omega-3 fatty acids 1000 MG capsule Take 2 g by mouth 2 times daily       clonazePAM (KLONOPIN) 0.5 MG tablet Take 1 tablet (0.5 mg) by mouth 3 times daily as needed for anxiety 270 tablet 1     traZODone (DESYREL) 50 MG tablet Take 1-2 tablets ( mg) by mouth At Bedtime 90 tablet 1     VITAMIN D, CHOLECALCIFEROL, PO Take 5,000 Units by mouth daily       psyllium (METAMUCIL) 58.6 % POWD Take 4 teaspoonful by mouth 2 times daily       Ranitidine HCl (ZANTAC PO) Take 150 mg by mouth daily as needed       calcium carbonate (TUMS) 500 MG chewable tablet Take 1 chew tab by mouth daily as needed       PARoxetine (PAXIL) 20 MG tablet Take 1 tablet (20 mg) by mouth daily (Patient not taking: Reported on 4/2/2018) 90 tablet 3     [DISCONTINUED] simvastatin (ZOCOR) 40 MG tablet Take 1 tablet by mouth at  bedtime 90 tablet 3     [DISCONTINUED] cyclobenzaprine (FLEXERIL) 10 MG tablet Take 1 tablet (10 mg) by mouth 3 times daily as needed for muscle spasms 90 tablet 1     Allergies   Allergen Reactions     Nkda [No Known Drug Allergies]      Recent Labs   Lab Test  03/23/17   0944  01/25/17   1144  03/07/16   0853  03/05/15   0859  03/04/13   A1C   --    --    --    --    --   5.7   LDL  87   --   115*  90   < >  115   HDL  60   --   56  65   < >  59   TRIG  83   --   70  76   < >  61   ALT  19  27  28  26   < >   --    CR  1.12  1.23  1.02  1.00   < >   --    GFRESTIMATED  66  59*  74  76   < >   --    GFRESTBLACK  80  72  89  >90   GFR Calc     < >   --    POTASSIUM  3.9  3.8  4.0  4.1   < >   --     < > = values in this interval not displayed.        Reviewed and updated as needed this visit by clinical staff         Reviewed and updated as needed this visit by Provider        Past Medical History:   Diagnosis Date     BPH (benign prostatic hyperplasia) 10/13/2010     Depression, major       "Diverticulosis      Erectile dysfunction      GERD (gastroesophageal reflux disease)      Hyperlipidemia LDL goal < 100      Incontinence of urine 1/11/2011     Meniere's disease 1/6/2010      Past Surgical History:   Procedure Laterality Date     C NONSPECIFIC PROCEDURE  10/15/10    GreenLight Laser Ablation of Prostate     ENT SURGERY      deviated septum at age 15      SURGICAL HISTORY OF -       rectal fistula      SURGICAL HISTORY OF -   1/2012    [place]: external sphincter for incontinence       Review of Systems  C: NEGATIVE for fever, chills, change in weight  I: NEGATIVE for worrisome rashes, moles or lesions  E: NEGATIVE for vision changes or irritation  ENT: NEGATIVE for ear, mouth and throat problems  R: NEGATIVE for significant cough or SOB  CV: NEGATIVE for chest pain, palpitations or peripheral edema  GI: NEGATIVE for nausea, abdominal pain, heartburn, or change in bowel habits   male: negative for dysuria, hematuria, decreased urinary stream, erectile dysfunction, urethral discharge  M: NEGATIVE for significant arthralgias or myalgia  N: NEGATIVE for weakness, dizziness or paresthesias  P: NEGATIVE for changes in mood or affect    OBJECTIVE:   /87 (Cuff Size: Adult Large)  Pulse 64  Temp 97.2  F (36.2  C) (Tympanic)  Resp 14  Ht 6' 0.25\" (1.835 m)  Wt 175 lb (79.4 kg)  SpO2 100%  BMI 23.57 kg/m2    Physical Exam  GENERAL: healthy, alert and no distress  EYES: Eyes grossly normal to inspection, PERRL and conjunctivae and sclerae normal  HENT: ear canals and TM's normal, nose and mouth without ulcers or lesions  NECK: no adenopathy, no asymmetry, masses, or scars and thyroid normal to palpation  RESP: lungs clear to auscultation - no rales, rhonchi or wheezes  CV: regular rate and rhythm, normal S1 S2, no S3 or S4, no murmur, click or rub, no peripheral edema and peripheral pulses strong  ABDOMEN: soft, nontender, no hepatosplenomegaly, no masses and bowel sounds normal   (male): normal " "male genitalia without lesions or urethral discharge, no hernia  MS: no gross musculoskeletal defects noted, no edema  SKIN: no suspicious lesions or rashes  NEURO: Normal strength and tone, mentation intact and speech normal  BACK: no CVA tenderness, no paralumbar tenderness  PSYCH: mentation appears normal, affect normal/bright  LYMPH: no cervical, supraclavicular, axillary, or inguinal adenopathy    ASSESSMENT/PLAN:       ICD-10-CM    1. Health care maintenance Z00.00 CBC with platelets     Comprehensive metabolic panel     Lipid panel reflex to direct LDL Fasting     PSA, screen     **Hepatitis C Screen Reflex to RNA FUTURE anytime   2. Screening for prostate cancer Z12.5 PSA, screen   3. Need for hepatitis C screening test Z11.59 **Hepatitis C Screen Reflex to RNA FUTURE anytime   4. Hyperlipidemia LDL goal <130 E78.5 simvastatin (ZOCOR) 40 MG tablet   5. Vertigo R42 meclizine (ANTIVERT) 25 MG tablet   6. Dorsal back pain M54.9 cyclobenzaprine (FLEXERIL) 10 MG tablet       COUNSELING:   Reviewed preventive health counseling, as reflected in patient instructions         reports that he quit smoking about 37 years ago. He has quit using smokeless tobacco.    Estimated body mass index is 23.94 kg/(m^2) as calculated from the following:    Height as of 12/18/17: 6' 0.5\" (1.842 m).    Weight as of 12/18/17: 179 lb (81.2 kg).       Counseling Resources:  ATP IV Guidelines  Pooled Cohorts Equation Calculator  FRAX Risk Assessment  ICSI Preventive Guidelines  Dietary Guidelines for Americans, 2010  USDA's MyPlate  ASA Prophylaxis  Lung CA Screening    Tj Stover MD  [facility] [place]  Answers for HPI/ROS submitted by the patient on 3/30/2018   PHQ-2 Score: 0    "

## 2018-04-03 LAB
ALBUMIN SERPL-MCNC: 3.8 G/DL (ref 3.4–5)
ALP SERPL-CCNC: 57 U/L (ref 40–150)
ALT SERPL W P-5'-P-CCNC: 25 U/L (ref 0–70)
ANION GAP SERPL CALCULATED.3IONS-SCNC: 7 MMOL/L (ref 3–14)
AST SERPL W P-5'-P-CCNC: 28 U/L (ref 0–45)
BILIRUB SERPL-MCNC: 0.6 MG/DL (ref 0.2–1.3)
BUN SERPL-MCNC: 10 MG/DL (ref 7–30)
CALCIUM SERPL-MCNC: 8.9 MG/DL (ref 8.5–10.1)
CHLORIDE SERPL-SCNC: 100 MMOL/L (ref 94–109)
CHOLEST SERPL-MCNC: 167 MG/DL
CO2 SERPL-SCNC: 29 MMOL/L (ref 20–32)
CREAT SERPL-MCNC: 1.15 MG/DL (ref 0.66–1.25)
GFR SERPL CREATININE-BSD FRML MDRD: 64 ML/MIN/1.7M2
GLUCOSE SERPL-MCNC: 88 MG/DL (ref 70–99)
HCV AB SERPL QL IA: NONREACTIVE
HDLC SERPL-MCNC: 67 MG/DL
LDLC SERPL CALC-MCNC: 86 MG/DL
NONHDLC SERPL-MCNC: 100 MG/DL
POTASSIUM SERPL-SCNC: 4 MMOL/L (ref 3.4–5.3)
PROT SERPL-MCNC: 7.2 G/DL (ref 6.8–8.8)
PSA SERPL-ACNC: 0.82 UG/L (ref 0–4)
SODIUM SERPL-SCNC: 136 MMOL/L (ref 133–144)
TRIGL SERPL-MCNC: 69 MG/DL

## 2018-04-03 ASSESSMENT — PATIENT HEALTH QUESTIONNAIRE - PHQ9: SUM OF ALL RESPONSES TO PHQ QUESTIONS 1-9: 0

## 2018-04-03 ASSESSMENT — ANXIETY QUESTIONNAIRES: GAD7 TOTAL SCORE: 2

## 2018-04-04 ENCOUNTER — TELEPHONE (OUTPATIENT)
Dept: FAMILY MEDICINE | Facility: CLINIC | Age: 63
End: 2018-04-04

## 2018-04-04 NOTE — TELEPHONE ENCOUNTER
Form completed and faxed to [employer]. Original mailed to the pt and copy sent to [employer].  Doris Pineda,

## 2018-10-12 DIAGNOSIS — F33.41 RECURRENT MAJOR DEPRESSIVE DISORDER, IN PARTIAL REMISSION (H): ICD-10-CM

## 2018-10-12 DIAGNOSIS — G47.00 INSOMNIA, UNSPECIFIED TYPE: ICD-10-CM

## 2018-10-15 RX ORDER — TRAZODONE HYDROCHLORIDE 50 MG/1
TABLET, FILM COATED ORAL
Qty: 90 TABLET | Refills: 0 | Status: SHIPPED | OUTPATIENT
Start: 2018-10-15 | End: 2018-12-02

## 2018-10-15 NOTE — TELEPHONE ENCOUNTER
Prescription approved per [facility], [facility] or [facility] refill protocol.  Phoebe Muñoz RN - Triage  [facility]

## 2018-10-15 NOTE — TELEPHONE ENCOUNTER
"Requested Prescriptions   Pending Prescriptions Disp Refills     traZODone (DESYREL) 50 MG tablet [Pharmacy Med Name: TRAZODONE  50MG  TAB]  Last Written Prescription Date:  12/11/17  Last Fill Quantity: 90,  # refills: 1   Last office visit: 4/2/2018 with prescribing provider:  Ck   Future Office Visit:     90 tablet      Sig: TAKE 1 TO 2 TABLETS BY  MOUTH AT BEDTIME    Serotonin Modulators Passed    10/12/2018  5:47 PM       Passed - Recent (12 mo) or future (30 days) visit within the authorizing provider's specialty    Patient had office visit in the last 12 months or has a visit in the next 30 days with authorizing provider or within the authorizing provider's specialty.  See \"Patient Info\" tab in inbasket, or \"Choose Columns\" in Meds & Orders section of the refill encounter.           Passed - Patient is age 18 or older          "

## 2018-12-02 DIAGNOSIS — F33.41 RECURRENT MAJOR DEPRESSIVE DISORDER, IN PARTIAL REMISSION (H): ICD-10-CM

## 2018-12-02 DIAGNOSIS — G47.00 INSOMNIA, UNSPECIFIED TYPE: ICD-10-CM

## 2018-12-03 NOTE — TELEPHONE ENCOUNTER
"Requested Prescriptions   Pending Prescriptions Disp Refills     traZODone (DESYREL) 50 MG tablet [Pharmacy Med Name: TRAZODONE  50MG  TAB] 90 tablet      Sig: TAKE 1 TO 2 TABLETS BY  MOUTH AT BEDTIME    Serotonin Modulators Passed    12/2/2018  9:05 PM       Passed - Recent (12 mo) or future (30 days) visit within the authorizing provider's specialty    Patient had office visit in the last 12 months or has a visit in the next 30 days with authorizing provider or within the authorizing provider's specialty.  See \"Patient Info\" tab in inbasket, or \"Choose Columns\" in Meds & Orders section of the refill encounter.      Last Written Prescription Date:  10/15/2018  Last Fill Quantity: ,90  # refills:  0Last office visit: 4/2/2018 with prescribing provider:     Future Office Visit:           Passed - Patient is age 18 or older          "

## 2018-12-04 RX ORDER — TRAZODONE HYDROCHLORIDE 50 MG/1
TABLET, FILM COATED ORAL
Qty: 90 TABLET | Refills: 1 | Status: SHIPPED | OUTPATIENT
Start: 2018-12-04 | End: 2019-03-08

## 2018-12-04 NOTE — TELEPHONE ENCOUNTER
PHQ-9 SCORE 12/11/2017 4/2/2018 12/3/2018   PHQ-9 Total Score - - -   PHQ-9 Total Score MyChart - - 2 (Minimal depression)   PHQ-9 Total Score 0 0 2     Routing refill request to provider for review/approval because:  Last OV over 6 months

## 2018-12-04 NOTE — TELEPHONE ENCOUNTER
Sent phq9 via [employer].        PHQ-9 SCORE 3/23/2017 12/11/2017 4/2/2018   PHQ-9 Total Score - - -   PHQ-9 Total Score [employer] - - -   PHQ-9 Total Score 2 0 0

## 2019-02-18 DIAGNOSIS — F33.1 MODERATE EPISODE OF RECURRENT MAJOR DEPRESSIVE DISORDER (H): ICD-10-CM

## 2019-02-18 DIAGNOSIS — F41.9 ANXIETY: ICD-10-CM

## 2019-02-18 RX ORDER — PAROXETINE 20 MG/1
TABLET, FILM COATED ORAL
Qty: 90 TABLET | Refills: 3 | Status: SHIPPED | OUTPATIENT
Start: 2019-02-18

## 2019-02-18 NOTE — TELEPHONE ENCOUNTER
"Routing to provider- last ov more than 6 months  Last Written Prescription Date:  12/18/17  Last Fill Quantity: 90,  # refills: 3   Last office visit: 4/2/2018 with prescribing provider:     Future Office Visit:   Next 5 appointments (look out 90 days)    Apr 03, 2019  9:00 AM CDT  PHYSICAL with Tj Stover MD  [facility] ([facility] [address] [place] [id number] [phone]         Requested Prescriptions   Pending Prescriptions Disp Refills     PARoxetine (PAXIL) 20 MG tablet [Pharmacy Med Name: PAROXETINE  20MG  TAB] 90 tablet 3     Sig: TAKE 1 TABLET BY MOUTH  DAILY    SSRIs Protocol Failed - 2/18/2019  5:58 AM       Failed - Recent (6 mo) or future (30 days) visit within the authorizing provider's specialty    Patient had office visit in the last 6 months or has a visit in the next 30 days with authorizing provider or within the authorizing provider's specialty.  See \"Patient Info\" tab in inbasket, or \"Choose Columns\" in Meds & Orders section of the refill encounter.           Passed - PHQ-9 score less than 5 in past 6 months    Please review last PHQ-9 score.          Passed - Medication is active on med list       Passed - Patient is age 18 or older        PHQ-9 SCORE 12/11/2017 4/2/2018 12/3/2018   PHQ-9 Total Score - - -   PHQ-9 Total Score MyChart - - 2 (Minimal depression)   PHQ-9 Total Score 0 0 2     ANCA-7 SCORE 1/27/2016 3/23/2017 4/2/2018   Total Score - - -   Total Score 4 5 2       "

## 2019-02-24 DIAGNOSIS — E78.5 HYPERLIPIDEMIA LDL GOAL <130: ICD-10-CM

## 2019-02-25 RX ORDER — SIMVASTATIN 40 MG
TABLET ORAL
Qty: 90 TABLET | Refills: 3 | OUTPATIENT
Start: 2019-02-25

## 2019-02-25 NOTE — TELEPHONE ENCOUNTER
Rx denied. Patent still has current refill on file. No pharmacy change.     Karen BRAYN, RN   [facility]

## 2019-02-25 NOTE — TELEPHONE ENCOUNTER
"Requested Prescriptions   Pending Prescriptions Disp Refills     simvastatin (ZOCOR) 40 MG tablet [Pharmacy Med Name: SIMVASTATIN  40MG  TAB] 90 tablet 3     Sig: TAKE 1 TABLET BY MOUTH AT  BEDTIME    Statins Protocol Passed - 2/24/2019  8:04 PM       Passed - LDL on file in past 12 months    Recent Labs   Lab Test 04/02/18  1152   LDL 86            Passed - No abnormal creatine kinase in past 12 months    No lab results found.            Passed - Recent (12 mo) or future (30 days) visit within the authorizing provider's specialty    Patient had office visit in the last 12 months or has a visit in the next 30 days with authorizing provider or within the authorizing provider's specialty.  See \"Patient Info\" tab in inbasket, or \"Choose Columns\" in Meds & Orders section of the refill encounter.             Passed - Medication is active on med list       Passed - Patient is age 18 or older        simvastatin (ZOCOR) 40 MG tablet 90 tablet 3 4/2/2018       Last Written Prescription Date:  4/2/18  Last Fill Quantity: 90,  # refills: 3   Last office visit: 4/2/2018 with prescribing provider:  Dr. Stover   Future Office Visit: 04/03/19  Next 5 appointments (look out 90 days)    Apr 03, 2019  9:00 AM CDT  PHYSICAL with Tj Stover MD  [facility] ([address] [address]  [phone]           "

## 2019-03-08 DIAGNOSIS — G47.00 INSOMNIA, UNSPECIFIED TYPE: ICD-10-CM

## 2019-03-08 DIAGNOSIS — F33.41 RECURRENT MAJOR DEPRESSIVE DISORDER, IN PARTIAL REMISSION (H): ICD-10-CM

## 2019-03-08 NOTE — TELEPHONE ENCOUNTER
"Requested Prescriptions   Pending Prescriptions Disp Refills     traZODone (DESYREL) 50 MG tablet [Pharmacy Med Name: TRAZODONE  50MG  TAB]Last Written Prescription Date:    Last Fill Quantity: 90 tablet,  # refills: 1   Last office visit: 4/2/2018 with prescribing provider:     Future Office Visit:   Next 5 appointments (look out 90 days)    Apr 03, 2019  9:00 AM CDT  PHYSICAL with Tj Stover MD  [facility] ([address] [address]  [phone]          90 tablet 1     Sig: TAKE 1 TO 2 TABLETS BY  MOUTH AT BEDTIME    Serotonin Modulators Passed - 3/8/2019 11:44 AM       Passed - Recent (12 mo) or future (30 days) visit within the authorizing provider's specialty    Patient had office visit in the last 12 months or has a visit in the next 30 days with authorizing provider or within the authorizing provider's specialty.  See \"Patient Info\" tab in inbasket, or \"Choose Columns\" in Meds & Orders section of the refill encounter.             Passed - Medication is active on med list       Passed - Patient is age 18 or older          "

## 2019-03-11 RX ORDER — TRAZODONE HYDROCHLORIDE 50 MG/1
TABLET, FILM COATED ORAL
Qty: 90 TABLET | Refills: 1 | Status: SHIPPED | OUTPATIENT
Start: 2019-03-11 | End: 2019-04-03

## 2019-03-11 NOTE — TELEPHONE ENCOUNTER
Dx is depression and insomnia    PHQ-9 SCORE 12/11/2017 4/2/2018 12/3/2018   PHQ-9 Total Score - - -   PHQ-9 Total Score MyChart - - 2 (Minimal depression)   PHQ-9 Total Score 0 0 2       Routing refill request to provider for review/approval because:  Patient needs to be seen because:  It has been more than 6 months since last office visit    Clarisse StinsonRN BSN  [facility]  [phone]

## 2019-03-15 ENCOUNTER — TRANSFERRED RECORDS (OUTPATIENT)
Dept: HEALTH INFORMATION MANAGEMENT | Facility: CLINIC | Age: 64
End: 2019-03-15

## 2019-03-31 ASSESSMENT — ACTIVITIES OF DAILY LIVING (ADL): CURRENT_FUNCTION: NO ASSISTANCE NEEDED

## 2019-04-03 ENCOUNTER — OFFICE VISIT (OUTPATIENT)
Dept: FAMILY MEDICINE | Facility: CLINIC | Age: 64
End: 2019-04-03
Payer: COMMERCIAL

## 2019-04-03 VITALS
RESPIRATION RATE: 14 BRPM | HEART RATE: 77 BPM | OXYGEN SATURATION: 98 % | SYSTOLIC BLOOD PRESSURE: 134 MMHG | BODY MASS INDEX: 23.16 KG/M2 | WEIGHT: 171 LBS | TEMPERATURE: 98.2 F | HEIGHT: 72 IN | DIASTOLIC BLOOD PRESSURE: 88 MMHG

## 2019-04-03 DIAGNOSIS — E78.5 HYPERLIPIDEMIA LDL GOAL <130: ICD-10-CM

## 2019-04-03 DIAGNOSIS — F33.1 MODERATE EPISODE OF RECURRENT MAJOR DEPRESSIVE DISORDER (H): ICD-10-CM

## 2019-04-03 DIAGNOSIS — Z23 NEED FOR VACCINATION: ICD-10-CM

## 2019-04-03 DIAGNOSIS — Z00.00 HEALTHCARE MAINTENANCE: Primary | ICD-10-CM

## 2019-04-03 DIAGNOSIS — Z12.5 SCREENING FOR PROSTATE CANCER: ICD-10-CM

## 2019-04-03 DIAGNOSIS — F41.9 ANXIETY: ICD-10-CM

## 2019-04-03 DIAGNOSIS — G47.00 INSOMNIA, UNSPECIFIED TYPE: ICD-10-CM

## 2019-04-03 DIAGNOSIS — F33.41 RECURRENT MAJOR DEPRESSIVE DISORDER, IN PARTIAL REMISSION (H): ICD-10-CM

## 2019-04-03 LAB
ALBUMIN SERPL-MCNC: 3.8 G/DL (ref 3.4–5)
ALP SERPL-CCNC: 54 U/L (ref 40–150)
ALT SERPL W P-5'-P-CCNC: 29 U/L (ref 0–70)
ANION GAP SERPL CALCULATED.3IONS-SCNC: 5 MMOL/L (ref 3–14)
AST SERPL W P-5'-P-CCNC: 36 U/L (ref 0–45)
BILIRUB SERPL-MCNC: 0.6 MG/DL (ref 0.2–1.3)
BUN SERPL-MCNC: 10 MG/DL (ref 7–30)
CALCIUM SERPL-MCNC: 9.2 MG/DL (ref 8.5–10.1)
CHLORIDE SERPL-SCNC: 100 MMOL/L (ref 94–109)
CHOLEST SERPL-MCNC: 144 MG/DL
CO2 SERPL-SCNC: 28 MMOL/L (ref 20–32)
CREAT SERPL-MCNC: 0.96 MG/DL (ref 0.66–1.25)
ERYTHROCYTE [DISTWIDTH] IN BLOOD BY AUTOMATED COUNT: 13.4 % (ref 10–15)
GFR SERPL CREATININE-BSD FRML MDRD: 82 ML/MIN/{1.73_M2}
GLUCOSE SERPL-MCNC: 84 MG/DL (ref 70–99)
HCT VFR BLD AUTO: 42.5 % (ref 40–53)
HDLC SERPL-MCNC: 71 MG/DL
HGB BLD-MCNC: 14.3 G/DL (ref 13.3–17.7)
LDLC SERPL CALC-MCNC: 65 MG/DL
MCH RBC QN AUTO: 31 PG (ref 26.5–33)
MCHC RBC AUTO-ENTMCNC: 33.6 G/DL (ref 31.5–36.5)
MCV RBC AUTO: 92 FL (ref 78–100)
NONHDLC SERPL-MCNC: 73 MG/DL
PLATELET # BLD AUTO: 178 10E9/L (ref 150–450)
POTASSIUM SERPL-SCNC: 4.1 MMOL/L (ref 3.4–5.3)
PROT SERPL-MCNC: 7.2 G/DL (ref 6.8–8.8)
RBC # BLD AUTO: 4.62 10E12/L (ref 4.4–5.9)
SODIUM SERPL-SCNC: 133 MMOL/L (ref 133–144)
TRIGL SERPL-MCNC: 41 MG/DL
WBC # BLD AUTO: 5.9 10E9/L (ref 4–11)

## 2019-04-03 PROCEDURE — G0103 PSA SCREENING: HCPCS | Performed by: FAMILY MEDICINE

## 2019-04-03 PROCEDURE — 36415 COLL VENOUS BLD VENIPUNCTURE: CPT | Performed by: FAMILY MEDICINE

## 2019-04-03 PROCEDURE — 90471 IMMUNIZATION ADMIN: CPT | Performed by: FAMILY MEDICINE

## 2019-04-03 PROCEDURE — 90472 IMMUNIZATION ADMIN EACH ADD: CPT | Performed by: FAMILY MEDICINE

## 2019-04-03 PROCEDURE — 90670 PCV13 VACCINE IM: CPT | Performed by: FAMILY MEDICINE

## 2019-04-03 PROCEDURE — 80053 COMPREHEN METABOLIC PANEL: CPT | Performed by: FAMILY MEDICINE

## 2019-04-03 PROCEDURE — 80061 LIPID PANEL: CPT | Performed by: FAMILY MEDICINE

## 2019-04-03 PROCEDURE — 90750 HZV VACC RECOMBINANT IM: CPT | Performed by: FAMILY MEDICINE

## 2019-04-03 PROCEDURE — 99397 PER PM REEVAL EST PAT 65+ YR: CPT | Mod: 25 | Performed by: FAMILY MEDICINE

## 2019-04-03 PROCEDURE — 85027 COMPLETE CBC AUTOMATED: CPT | Performed by: FAMILY MEDICINE

## 2019-04-03 PROCEDURE — 90715 TDAP VACCINE 7 YRS/> IM: CPT | Performed by: FAMILY MEDICINE

## 2019-04-03 RX ORDER — TRAZODONE HYDROCHLORIDE 50 MG/1
TABLET, FILM COATED ORAL
Qty: 90 TABLET | Refills: 3 | Status: SHIPPED | OUTPATIENT
Start: 2019-04-03

## 2019-04-03 RX ORDER — SIMVASTATIN 40 MG
TABLET ORAL
Qty: 90 TABLET | Refills: 3 | Status: SHIPPED | OUTPATIENT
Start: 2019-04-03 | End: 2019-04-03

## 2019-04-03 RX ORDER — SIMVASTATIN 40 MG
TABLET ORAL
Qty: 90 TABLET | Refills: 3 | Status: SHIPPED | OUTPATIENT
Start: 2019-04-03 | End: 2020-02-11

## 2019-04-03 RX ORDER — CLONAZEPAM 0.5 MG/1
0.5 TABLET ORAL 3 TIMES DAILY PRN
Qty: 270 TABLET | Refills: 1 | Status: SHIPPED | OUTPATIENT
Start: 2019-04-03 | End: 2019-04-03

## 2019-04-03 RX ORDER — TRAZODONE HYDROCHLORIDE 50 MG/1
TABLET, FILM COATED ORAL
Qty: 90 TABLET | Refills: 3 | Status: SHIPPED | OUTPATIENT
Start: 2019-04-03 | End: 2019-04-03

## 2019-04-03 RX ORDER — CLONAZEPAM 0.5 MG/1
0.5 TABLET ORAL 3 TIMES DAILY PRN
Qty: 270 TABLET | Refills: 1 | Status: SHIPPED | OUTPATIENT
Start: 2019-04-03

## 2019-04-03 ASSESSMENT — ANXIETY QUESTIONNAIRES
2. NOT BEING ABLE TO STOP OR CONTROL WORRYING: SEVERAL DAYS
7. FEELING AFRAID AS IF SOMETHING AWFUL MIGHT HAPPEN: SEVERAL DAYS
5. BEING SO RESTLESS THAT IT IS HARD TO SIT STILL: NOT AT ALL
IF YOU CHECKED OFF ANY PROBLEMS ON THIS QUESTIONNAIRE, HOW DIFFICULT HAVE THESE PROBLEMS MADE IT FOR YOU TO DO YOUR WORK, TAKE CARE OF THINGS AT HOME, OR GET ALONG WITH OTHER PEOPLE: SOMEWHAT DIFFICULT
GAD7 TOTAL SCORE: 11
3. WORRYING TOO MUCH ABOUT DIFFERENT THINGS: MORE THAN HALF THE DAYS
6. BECOMING EASILY ANNOYED OR IRRITABLE: NEARLY EVERY DAY
1. FEELING NERVOUS, ANXIOUS, OR ON EDGE: MORE THAN HALF THE DAYS

## 2019-04-03 ASSESSMENT — PATIENT HEALTH QUESTIONNAIRE - PHQ9
SUM OF ALL RESPONSES TO PHQ QUESTIONS 1-9: 3
5. POOR APPETITE OR OVEREATING: MORE THAN HALF THE DAYS

## 2019-04-03 ASSESSMENT — MIFFLIN-ST. JEOR: SCORE: 1602.62

## 2019-04-03 ASSESSMENT — ACTIVITIES OF DAILY LIVING (ADL): CURRENT_FUNCTION: NO ASSISTANCE NEEDED

## 2019-04-03 NOTE — NURSING NOTE
Prescription for clonazepam faxed to \[facility]\""[facility]  Doris Pineda,

## 2019-04-03 NOTE — NURSING NOTE
Screening Questionnaire for Adult Immunization    Are you sick today?   No   Do you have allergies to medications, food, a vaccine component or latex?   No   Have you ever had a serious reaction after receiving a vaccination?   No   Do you have a long-term health problem with heart disease, lung disease, asthma, kidney disease, metabolic disease (e.g. diabetes), anemia, or other blood disorder?   No   Do you have cancer, leukemia, HIV/AIDS, or any other immune system problem?   No   In the past 3 months, have you taken medications that affect  your immune system, such as prednisone, other steroids, or anticancer drugs; drugs for the treatment of rheumatoid arthritis, Crohn s disease, or psoriasis; or have you had radiation treatments?   No   Have you had a seizure, or a brain or other nervous system problem?   No   During the past year, have you received a transfusion of blood or blood     products, or been given immune (gamma) globulin or antiviral drug?   No   For women: Are you pregnant or is there a chance you could become        pregnant during the next month?   No   Have you received any vaccinations in the past 4 weeks?   No     Immunization questionnaire answers were all negative.        Per orders of Dr. Stover, injection of Tdap, Shingrix, PCV 13 given by Doretha Dias. Patient instructed to remain in clinic for 15 minutes afterwards, and to report any adverse reaction to me immediately.       Screening performed by Doretha Dias on 4/3/2019 at 10:14 AM.

## 2019-04-03 NOTE — PROGRESS NOTES
"SUBJECTIVE:   [place] is a 63 year old male who presents for Preventive Visit.      Are you in the first 12 months of your Medicare coverage?  No    Annual Wellness Visit     In general, how would you rate your overall health?  Good    Frequency of exercise:  2-3 days/week    Duration of exercise:  45-60 minutes    Do you usually eat at least 4 servings of fruit and vegetables a day, include whole grains    & fiber and avoid regularly eating high fat or \"junk\" foods?  Yes    Taking medications regularly:  Yes    Medication side effects:  Not applicable    Ability to successfully perform activities of daily living:  No assistance needed    Home Safety:  Lack of grab bars in the bathroom    Hearing Impairment:  Difficulty following a conversation in a noisy restaurant or crowded room, feel that people are mumbling or not speaking clearly, find that men's voices are easier to understand than woman's and difficulty understanding soft or whispered speech    In the past 6 months, have you been bothered by leaking of urine? Yes    In general, how would you rate your overall mental or emotional health?  Fair    PHQ-2 Total Score: 1    Additional concerns today:  Yes    Do you feel safe in your environment? Yes    Do you have a Health Care Directive? No: Advance care planning was reviewed with patient; patient declined at this time.      Fall risk  Fallen 2 or more times in the past year?: No  Any fall with injury in the past year?: Yes  Timed Up and Go Test (>13.5 is fall risk; contact physician) : 6    Cognitive Screening   1) Repeat 3 items (Leader, Season, Table)    2) Clock draw: NORMAL  3) 3 item recall: Recalls 3 objects  Results: 3 items recalled: COGNITIVE IMPAIRMENT LESS LIKELY    Mini-CogTM Copyright RAFAEL Ellison. Licensed by the author for use in [facility]; reprinted with permission (emmanuel@.[email or web address]). All rights reserved.      Do you have sleep apnea, excessive snoring or daytime drowsiness?: " no    Reviewed and updated as needed this visit by clinical staff  Allergies  Meds         Reviewed and updated as needed this visit by Provider        Social History     Tobacco Use     Smoking status: Former Smoker     Last attempt to quit: 1981     Years since quittin.2     Smokeless tobacco: Former User     Tobacco comment: 10 pack year history    Substance Use Topics     Alcohol use: Yes     Alcohol/week: 0.0 oz     Comment: 12pack per month       Alcohol Use 3/31/2019   If you drink alcohol do you typically have greater than 3 drinks per day OR greater than 7 drinks per week? No   No flowsheet data found.      Current providers sharing in care for this patient include:   Patient Care Team:  Tj Stover MD as PCP - General (Family Practice)  Tj Stover MD as Assigned PCP    The following health maintenance items are reviewed in Epic and correct as of today:  Health Maintenance   Topic Date Due     HIV SCREEN (SYSTEM ASSIGNED)  1971     ZOSTER IMMUNIZATION (1 of 2) 2003     ADVANCE DIRECTIVE PLANNING Q5 YRS  08/10/2016     FALL RISK ASSESSMENT  2018     PNEUMOCOCCAL IMMUNIZATION 65+ LOW/MEDIUM RISK (1 of 2 - PCV13) 2018     AORTIC ANEURYSM SCREENING (SYSTEM ASSIGNED)  2018     INFLUENZA VACCINE (1) 2018     DTAP/TDAP/TD IMMUNIZATION (2 - Td) 12/10/2018     MEDICARE ANNUAL WELLNESS VISIT  2019     ANCA QUESTIONNAIRE 1 YEAR  2019     PHQ-9 Q6 MONTHS  2019     LIPID SCREEN Q5 YR MALE (SYSTEM ASSIGNED)  2023     COLONOSCOPY Q10 YR  10/09/2023     DEPRESSION ACTION PLAN  Completed     HEPATITIS C SCREENING  Completed     IPV IMMUNIZATION  Aged Out     MENINGITIS IMMUNIZATION  Aged Out     BP Readings from Last 3 Encounters:   19 134/88   18 129/87   17 118/74    Wt Readings from Last 3 Encounters:   19 77.6 kg (171 lb)   18 79.4 kg (175 lb)   17 81.2 kg (179 lb)                  Patient Active Problem List    Diagnosis     Depression, major     Erectile dysfunction     GERD (gastroesophageal reflux disease)     Meniere's disease     BPH (benign prostatic hyperplasia)     Incontinence of urine     Hyperlipidemia LDL goal <130     Advanced directives, counseling/discussion     Back pain, chronic     Humerus fracture     Past Surgical History:   Procedure Laterality Date     C NONSPECIFIC PROCEDURE  10/15/10    GreenLight Laser Ablation of Prostate     ENT SURGERY      deviated septum at age 15      SURGICAL HISTORY OF -       rectal fistula      SURGICAL HISTORY OF -   2012    [place]: external sphincter for incontinence       Social History     Tobacco Use     Smoking status: Former Smoker     Last attempt to quit: 1981     Years since quittin.2     Smokeless tobacco: Former User     Tobacco comment: 10 pack year history    Substance Use Topics     Alcohol use: Yes     Alcohol/week: 0.0 oz     Comment: 12pack per month     Family History   Problem Relation Age of Onset     Depression Sister      Depression Mother      Heart Disease Mother         CAD      Lipids Mother      Depression Father      Alzheimer Disease Father      Diabetes Father      Hypertension Father      Lipids Maternal Grandmother      Lipids Maternal Grandfather      C.A.D. Sister      Asthma No family hx of      Cancer - colorectal No family hx of      Prostate Cancer No family hx of      Eye Disorder No family hx of      Thyroid Disease No family hx of          Current Outpatient Medications   Medication Sig Dispense Refill     ASPIRIN 81 PO Take by mouth daily       calcium carbonate (TUMS) 500 MG chewable tablet Take 1 chew tab by mouth daily as needed       Calcium-Magnesium-Zinc 333-133-5 MG TABS per tablet Take 1 tablet by mouth daily       clonazePAM (KLONOPIN) 0.5 MG tablet Take 1 tablet (0.5 mg) by mouth 3 times daily as needed for anxiety 270 tablet 1     meclizine (ANTIVERT) 25 MG tablet Take 1 tablet (25 mg) by mouth every 6 hours  "as needed for dizziness 30 tablet 1     PARoxetine (PAXIL) 20 MG tablet TAKE 1 TABLET BY MOUTH  DAILY 90 tablet 3     psyllium (METAMUCIL) 58.6 % POWD Take 4 teaspoonful by mouth 2 times daily       Ranitidine HCl (ZANTAC PO) Take 150 mg by mouth daily as needed       simvastatin (ZOCOR) 40 MG tablet Take 1 tablet by mouth at  bedtime 90 tablet 3     traZODone (DESYREL) 50 MG tablet TAKE 1 TO 2 TABLETS BY  MOUTH AT BEDTIME 90 tablet 3     VITAMIN D, CHOLECALCIFEROL, PO Take 5,000 Units by mouth daily       cyclobenzaprine (FLEXERIL) 10 MG tablet Take 1 tablet (10 mg) by mouth 3 times daily as needed for muscle spasms (Patient not taking: Reported on 4/3/2019) 30 tablet 1     Allergies   Allergen Reactions     Nkda [No Known Drug Allergies]      Recent Labs   Lab Test 04/02/18  1152 03/23/17  0944 01/25/17  1144 03/07/16  0853  03/04/13   A1C  --   --   --   --   --  5.7   LDL 86 87  --  115*   < > 115   HDL 67 60  --  56   < > 59   TRIG 69 83  --  70   < > 61   ALT 25 19 27 28   < >  --    CR 1.15 1.12 1.23 1.02   < >  --    GFRESTIMATED 64 66 59* 74   < >  --    GFRESTBLACK 77 80 72 89   < >  --    POTASSIUM 4.0 3.9 3.8 4.0   < >  --     < > = values in this interval not displayed.      Pneumonia Vaccine:Adults age 65+ who received Pneumovax (PPSV23) at 65 years or older: Should be given PCV13 > 1 year after their most recent PPSV23    Review of Systems  Constitutional, HEENT, cardiovascular, pulmonary, gi and gu systems are negative, except as otherwise noted.    OBJECTIVE:   /88 (Cuff Size: Adult Regular)   Pulse 77   Temp 98.2  F (36.8  C) (Tympanic)   Resp 14   Ht 1.835 m (6' 0.25\")   Wt 77.6 kg (171 lb)   SpO2 98%   BMI 23.03 kg/m   Estimated body mass index is 23.03 kg/m  as calculated from the following:    Height as of this encounter: 1.835 m (6' 0.25\").    Weight as of this encounter: 77.6 kg (171 lb).  Physical Exam  GENERAL: healthy, alert and no distress  EYES: Eyes grossly normal to " inspection, PERRL and conjunctivae and sclerae normal  HENT: ear canals and TM's normal, nose and mouth without ulcers or lesions  NECK: no adenopathy, no asymmetry, masses, or scars and thyroid normal to palpation  RESP: lungs clear to auscultation - no rales, rhonchi or wheezes  CV: regular rate and rhythm, normal S1 S2, no S3 or S4, no murmur, click or rub, no peripheral edema and peripheral pulses strong  ABDOMEN: soft, nontender, no hepatosplenomegaly, no masses and bowel sounds normal   (male): normal male genitalia without lesions or urethral discharge, no hernia  MS: no gross musculoskeletal defects noted, no edema  SKIN: no suspicious lesions or rashes  NEURO: Normal strength and tone, mentation intact and speech normal  BACK: no CVA tenderness, no paralumbar tenderness  PSYCH: mentation appears normal, affect normal/bright  LYMPH: no cervical, supraclavicular, axillary, or inguinal adenopathy        ASSESSMENT / PLAN:       ICD-10-CM    1. Healthcare maintenance Z00.00 CBC with platelets     Comprehensive metabolic panel     Lipid panel reflex to direct LDL Fasting     PSA, screen   2. Screening for prostate cancer Z12.5 PSA, screen   3. Hyperlipidemia LDL goal <130 E78.5 simvastatin (ZOCOR) 40 MG tablet     DISCONTINUED: simvastatin (ZOCOR) 40 MG tablet   4. Recurrent major depressive disorder, in partial remission (H) F33.41 traZODone (DESYREL) 50 MG tablet     DISCONTINUED: traZODone (DESYREL) 50 MG tablet   5. Insomnia, unspecified type G47.00 traZODone (DESYREL) 50 MG tablet     DISCONTINUED: traZODone (DESYREL) 50 MG tablet   6. Moderate episode of recurrent major depressive disorder (H) F33.1 clonazePAM (KLONOPIN) 0.5 MG tablet     DISCONTINUED: clonazePAM (KLONOPIN) 0.5 MG tablet   7. Anxiety F41.9 clonazePAM (KLONOPIN) 0.5 MG tablet     DISCONTINUED: clonazePAM (KLONOPIN) 0.5 MG tablet       End of Life Planning:  Patient currently has an advanced directive: Yes.  Practitioner is supportive of  "decision.    COUNSELING:  Reviewed preventive health counseling, as reflected in patient instructions    BP Readings from Last 1 Encounters:   04/03/19 134/88     Estimated body mass index is 23.03 kg/m  as calculated from the following:    Height as of this encounter: 1.835 m (6' 0.25\").    Weight as of this encounter: 77.6 kg (171 lb).           reports that he quit smoking about 38 years ago. He has quit using smokeless tobacco.      Appropriate preventive services were discussed with this patient, including applicable screening as appropriate for cardiovascular disease, diabetes, osteopenia/osteoporosis, and glaucoma.  As appropriate for age/gender, discussed screening for colorectal cancer, prostate cancer, breast cancer, and cervical cancer. Checklist reviewing preventive services available has been given to the patient.    Reviewed patients plan of care and provided an AVS. The Basic Care Plan (routine screening as documented in Health Maintenance) for Hawk meets the Care Plan requirement. This Care Plan has been established and reviewed with the Patient.    Counseling Resources:  ATP IV Guidelines  Pooled Cohorts Equation Calculator  Breast Cancer Risk Calculator  FRAX Risk Assessment  ICSI Preventive Guidelines  Dietary Guidelines for Americans, 2010  [employer]'s MyPlate  ASA Prophylaxis  Lung CA Screening    Tj Stover MD  [facility]  "

## 2019-04-04 ENCOUNTER — TELEPHONE (OUTPATIENT)
Dept: FAMILY MEDICINE | Facility: CLINIC | Age: 64
End: 2019-04-04

## 2019-04-04 LAB — PSA SERPL-ACNC: 0.84 UG/L (ref 0–4)

## 2019-04-04 ASSESSMENT — ANXIETY QUESTIONNAIRES: GAD7 TOTAL SCORE: 11

## 2019-04-04 NOTE — TELEPHONE ENCOUNTER
Form received and completed. Form faxed to [employer]. Copy sent to [employer] and original mailed to the pt.  Doris Pineda,

## 2019-04-17 DIAGNOSIS — R42 VERTIGO: ICD-10-CM

## 2019-04-17 RX ORDER — MECLIZINE HYDROCHLORIDE 25 MG/1
25 TABLET ORAL EVERY 6 HOURS PRN
Qty: 90 TABLET | Refills: 0 | Status: SHIPPED | OUTPATIENT
Start: 2019-04-17 | End: 2019-04-19

## 2019-04-17 NOTE — TELEPHONE ENCOUNTER
"Requested Prescriptions   Pending Prescriptions Disp Refills     meclizine (ANTIVERT) 25 MG tablet 30 tablet 1     Sig: Take 1 tablet (25 mg) by mouth every 6 hours as needed for dizziness        Antivertigo/Antiemetic Agents Passed - 4/17/2019 10:39 AM        Passed - Recent (12 mo) or future (30 days) visit within the authorizing provider's specialty     Patient had office visit in the last 12 months or has a visit in the next 30 days with authorizing provider or within the authorizing provider's specialty.  See \"Patient Info\" tab in inbasket, or \"Choose Columns\" in Meds & Orders section of the refill encounter.              Passed - Medication is active on med list        Passed - Patient is 18 years of age or older        Prescription approved per [facility] Refill Protocol.  Kayleigh Hargrove RN   [facility] - Triage     "

## 2019-04-17 NOTE — TELEPHONE ENCOUNTER
Reason for Call:   medication refill:    Do you use a [facility]?  Name of the pharmacy and phone number for the current request:  Do the [facility] order please [facility]     Name of the medication requested:   meclizine (ANTIVERT) 25 MG tablet  Other request:     Can we leave a detailed message on this number? Yes     Phone number patient can be reached at: Cell number on file:    Telephone Information:   Mobile [phone]       Best Time:any    Call taken on 4/17/2019 at 10:36 AM by Suzy Greenwood

## 2019-04-19 DIAGNOSIS — R42 VERTIGO: ICD-10-CM

## 2019-04-19 RX ORDER — MECLIZINE HYDROCHLORIDE 25 MG/1
25 TABLET ORAL EVERY 6 HOURS PRN
Qty: 90 TABLET | Refills: 0 | Status: SHIPPED | OUTPATIENT
Start: 2019-04-19

## 2019-04-19 NOTE — TELEPHONE ENCOUNTER
Patient note: pt said his insurance doesn't include this med on there mail order and they said he could pay cash but he would rather go to [facility] in [place]. Please call pt if any questions.    rx sent to new pharmacy. Prescription approved per [facility] Refill Protocol.    Karen THORNTON, RN   [facility]

## 2019-04-19 NOTE — TELEPHONE ENCOUNTER
Reason for Call:  Other prescription    Detailed comments: pt said his insurance doesn't include this med on there mail order and they said he could pay cash but he would rather go to [facility] in [place]. Please call pt if any questions.    Phone Number Patient can be reached at: Home number on file [phone] (home)    Best Time: any    Can we leave a detailed message on this number? YES    Call taken on 4/19/2019 at 12:27 PM by Renetta Lozano

## 2019-04-19 NOTE — TELEPHONE ENCOUNTER
"Requested Prescriptions   Pending Prescriptions Disp Refills     meclizine (ANTIVERT) 25 MG tablet  Last Written Prescription Date:  4-  Last Fill Quantity: 90 tablet,  # refills: 0   Last office visit: 4/3/2019 with prescribing provider:     Future Office Visit:     90 tablet 0     Sig: Take 1 tablet (25 mg) by mouth every 6 hours as needed for dizziness        Antivertigo/Antiemetic Agents Passed - 4/19/2019 12:29 PM        Passed - Recent (12 mo) or future (30 days) visit within the authorizing provider's specialty     Patient had office visit in the last 12 months or has a visit in the next 30 days with authorizing provider or within the authorizing provider's specialty.  See \"Patient Info\" tab in inbasket, or \"Choose Columns\" in Meds & Orders section of the refill encounter.              Passed - Medication is active on med list        Passed - Patient is 18 years of age or older          "

## 2019-10-02 ENCOUNTER — HEALTH MAINTENANCE LETTER (OUTPATIENT)
Age: 64
End: 2019-10-02

## 2019-10-30 ENCOUNTER — HEALTH MAINTENANCE LETTER (OUTPATIENT)
Age: 64
End: 2019-10-30

## 2020-02-10 DIAGNOSIS — E78.5 HYPERLIPIDEMIA LDL GOAL <130: ICD-10-CM

## 2020-02-11 RX ORDER — SIMVASTATIN 40 MG
TABLET ORAL
Qty: 90 TABLET | Refills: 0 | Status: SHIPPED | OUTPATIENT
Start: 2020-02-11

## 2020-02-11 NOTE — TELEPHONE ENCOUNTER
"Last Written Prescription Date:  4/3/19  Last Fill Quantity: 90 tablets,  # refills: 3   Last office visit: 4/3/2019 with prescribing provider:  Ck   Future Office Visit:      Requested Prescriptions   Pending Prescriptions Disp Refills     simvastatin (ZOCOR) 40 MG tablet 90 tablet 3     Sig: Take 1 tablet by mouth at  bedtime       Statins Protocol Passed - 2/10/2020  6:10 PM        Passed - LDL on file in past 12 months     Recent Labs   Lab Test 04/03/19  0947   LDL 65             Passed - No abnormal creatine kinase in past 12 months     No lab results found.             Passed - Recent (12 mo) or future (30 days) visit within the authorizing provider's specialty     Patient has had an office visit with the authorizing provider or a provider within the authorizing providers department within the previous 12 mos or has a future within next 30 days. See \"Patient Info\" tab in inbasket, or \"Choose Columns\" in Meds & Orders section of the refill encounter.              Passed - Medication is active on med list        Passed - Patient is age 18 or older          "

## 2020-02-11 NOTE — TELEPHONE ENCOUNTER
[place] through [facility] [place].  Clarisse FUENTES RN  [facility]  [phone]

## 2020-03-02 ENCOUNTER — TRANSFERRED RECORDS (OUTPATIENT)
Dept: HEALTH INFORMATION MANAGEMENT | Facility: CLINIC | Age: 65
End: 2020-03-02

## 2020-05-19 DIAGNOSIS — F41.9 ANXIETY: ICD-10-CM

## 2020-05-19 DIAGNOSIS — F33.1 MODERATE EPISODE OF RECURRENT MAJOR DEPRESSIVE DISORDER (H): ICD-10-CM

## 2020-05-19 DIAGNOSIS — F33.41 RECURRENT MAJOR DEPRESSIVE DISORDER, IN PARTIAL REMISSION (H): ICD-10-CM

## 2020-05-19 DIAGNOSIS — G47.00 INSOMNIA, UNSPECIFIED TYPE: ICD-10-CM

## 2020-05-19 NOTE — TELEPHONE ENCOUNTER
Patient is overdue for office visit. Routing to team to assist with scheduling virtual visit, once patient is scheduled route back to triage to address refill.    Ronel Mejia RN, BSN  [facility]

## 2020-05-19 NOTE — TELEPHONE ENCOUNTER
Reason for Call:  Medication or medication refill:    Do you use a [facility]?  Name of the pharmacy and phone number for the current request:  [facility]    Name of the medication requested: paxil, trazodone    Other request:     Can we leave a detailed message on this number? YES    Phone number patient can be reached at: Home number on file [phone] (home)    Best Time:     Call taken on 5/19/2020 at 9:59 AM by Noni Veloz

## 2020-05-21 RX ORDER — PAROXETINE 20 MG/1
20 TABLET, FILM COATED ORAL DAILY
Qty: 90 TABLET | Refills: 3 | OUTPATIENT
Start: 2020-05-21

## 2020-05-21 RX ORDER — TRAZODONE HYDROCHLORIDE 50 MG/1
TABLET, FILM COATED ORAL
Qty: 90 TABLET | Refills: 3 | OUTPATIENT
Start: 2020-05-21

## 2020-05-21 NOTE — TELEPHONE ENCOUNTER
Pt has transferred his care to [facility]. Please remove pended medication. Thank you.  Doris Pineda,

## 2020-05-21 NOTE — TELEPHONE ENCOUNTER
Pt no longer under [facility] and went to [facility].    Marilin MASON RN  EP Triage

## 2020-05-26 ENCOUNTER — TRANSFERRED RECORDS (OUTPATIENT)
Dept: HEALTH INFORMATION MANAGEMENT | Facility: CLINIC | Age: 65
End: 2020-05-26

## 2020-07-06 ENCOUNTER — TRANSFERRED RECORDS (OUTPATIENT)
Dept: HEALTH INFORMATION MANAGEMENT | Facility: CLINIC | Age: 65
End: 2020-07-06

## 2020-08-31 ENCOUNTER — TRANSFERRED RECORDS (OUTPATIENT)
Dept: HEALTH INFORMATION MANAGEMENT | Facility: CLINIC | Age: 65
End: 2020-08-31

## 2021-01-15 ENCOUNTER — HEALTH MAINTENANCE LETTER (OUTPATIENT)
Age: 66
End: 2021-01-15

## 2021-08-09 ENCOUNTER — TRANSFERRED RECORDS (OUTPATIENT)
Dept: HEALTH INFORMATION MANAGEMENT | Facility: CLINIC | Age: 66
End: 2021-08-09

## 2021-08-13 ENCOUNTER — TRANSFERRED RECORDS (OUTPATIENT)
Dept: HEALTH INFORMATION MANAGEMENT | Facility: CLINIC | Age: 66
End: 2021-08-13

## 2021-09-04 ENCOUNTER — HEALTH MAINTENANCE LETTER (OUTPATIENT)
Age: 66
End: 2021-09-04

## 2022-02-19 ENCOUNTER — HEALTH MAINTENANCE LETTER (OUTPATIENT)
Age: 67
End: 2022-02-19

## 2022-10-22 ENCOUNTER — HEALTH MAINTENANCE LETTER (OUTPATIENT)
Age: 67
End: 2022-10-22

## 2023-01-23 ENCOUNTER — TRANSFERRED RECORDS (OUTPATIENT)
Dept: HEALTH INFORMATION MANAGEMENT | Facility: CLINIC | Age: 68
End: 2023-01-23
Payer: COMMERCIAL

## 2023-06-26 ENCOUNTER — APPOINTMENT (OUTPATIENT)
Dept: URBAN - METROPOLITAN AREA CLINIC 254 | Age: 68
Setting detail: DERMATOLOGY
End: 2023-06-27

## 2023-06-26 VITALS — RESPIRATION RATE: 14 BRPM | HEIGHT: 73 IN | WEIGHT: 200 LBS

## 2023-06-26 DIAGNOSIS — D22 MELANOCYTIC NEVI: ICD-10-CM

## 2023-06-26 DIAGNOSIS — L82.0 INFLAMED SEBORRHEIC KERATOSIS: ICD-10-CM

## 2023-06-26 DIAGNOSIS — L81.4 OTHER MELANIN HYPERPIGMENTATION: ICD-10-CM

## 2023-06-26 DIAGNOSIS — D18.0 HEMANGIOMA: ICD-10-CM

## 2023-06-26 DIAGNOSIS — L28.0 LICHEN SIMPLEX CHRONICUS: ICD-10-CM

## 2023-06-26 DIAGNOSIS — Z71.89 OTHER SPECIFIED COUNSELING: ICD-10-CM

## 2023-06-26 DIAGNOSIS — L82.1 OTHER SEBORRHEIC KERATOSIS: ICD-10-CM

## 2023-06-26 PROBLEM — D22.5 MELANOCYTIC NEVI OF TRUNK: Status: ACTIVE | Noted: 2023-06-26

## 2023-06-26 PROBLEM — D18.01 HEMANGIOMA OF SKIN AND SUBCUTANEOUS TISSUE: Status: ACTIVE | Noted: 2023-06-26

## 2023-06-26 PROCEDURE — 99203 OFFICE O/P NEW LOW 30 MIN: CPT | Mod: 25

## 2023-06-26 PROCEDURE — OTHER COUNSELING: OTHER

## 2023-06-26 PROCEDURE — OTHER MIPS QUALITY: OTHER

## 2023-06-26 PROCEDURE — OTHER LIQUID NITROGEN: OTHER

## 2023-06-26 PROCEDURE — 17110 DESTRUCT B9 LESION 1-14: CPT

## 2023-06-26 ASSESSMENT — LOCATION DETAILED DESCRIPTION DERM
LOCATION DETAILED: LEFT ANTERIOR PROXIMAL THIGH
LOCATION DETAILED: RIGHT LATERAL FOREHEAD
LOCATION DETAILED: LEFT INFERIOR ANTERIOR NECK
LOCATION DETAILED: SUPERIOR THORACIC SPINE
LOCATION DETAILED: RIGHT SUPERIOR MEDIAL UPPER BACK
LOCATION DETAILED: GLUTEAL CLEFT
LOCATION DETAILED: EPIGASTRIC SKIN
LOCATION DETAILED: LEFT MEDIAL SUPERIOR CHEST
LOCATION DETAILED: RIGHT INFERIOR LATERAL FOREHEAD
LOCATION DETAILED: RIGHT LATERAL TEMPLE

## 2023-06-26 ASSESSMENT — LOCATION SIMPLE DESCRIPTION DERM
LOCATION SIMPLE: RIGHT FOREHEAD
LOCATION SIMPLE: RIGHT UPPER BACK
LOCATION SIMPLE: ABDOMEN
LOCATION SIMPLE: CHEST
LOCATION SIMPLE: RIGHT TEMPLE
LOCATION SIMPLE: UPPER BACK
LOCATION SIMPLE: LEFT ANTERIOR NECK
LOCATION SIMPLE: GLUTEAL CLEFT
LOCATION SIMPLE: LEFT THIGH

## 2023-06-26 ASSESSMENT — LOCATION ZONE DERM
LOCATION ZONE: TRUNK
LOCATION ZONE: FACE
LOCATION ZONE: NECK
LOCATION ZONE: LEG

## 2023-06-26 NOTE — PROCEDURE: LIQUID NITROGEN
Consent: - Bleomycin was mixed 1:1 with 1% lidocaine without epinephrine and 0.10mL bleomycin/lidocaine mixture was injected into the base of each wart via DIRECT intradermal injection.\\n- Informed consent was obtained prior to the procedure today. All questions were answered prior to the procedure.\\n- Discussed the risks of the procedure in detail including but not limited to pain, blistering, inflammation, scar, discoloration of treatment site, toe/finger tip necrosis, treatment site necrosis, Raynaud's, nail deformity, incomplete resolution of wart(s), wart recurrence, infection, and post-procedure pain which can be highly variable and in some cases severe.\\n- Discussed the expectation of pain during and after treatment. The pain can take several weeks to resolve in some cases.\\n- Patient expressed understanding. \\n- Following bleomycin treatment today, site(s) were treated with liquid nitrogen cryosurgery with patient's informed consent.\\n- Keep treated areas clean and covered with Vaseline ointment or Aquaphor and a bandage until healed.\\n- It can take between 4-6 weeks to heal after treatment with bleomycin.\\n- Notify the office if pain is worsening rather than improving after 48 hours, should any signs of infection be appreciated, or if any complications arise. Patient expressed understanding.\\n\\nVolume and Concentration: 0.1 ccs of a 1 units/cc solution\\nTotal Units Injected: 0.1 units\\[facility] #: [id number] Consent: - Bleomycin was mixed 1:1 with 1% lidocaine without epinephrine and 0.10mL bleomycin/lidocaine mixture was injected into the base of each wart via DIRECT intradermal injection.\\n- Informed consent was obtained prior to the procedure today. All questions were answered prior to the procedure.\\n- Discussed the risks of the procedure in detail including but not limited to pain, blistering, inflammation, scar, discoloration of treatment site, toe/finger tip necrosis, treatment site necrosis, Raynaud's, nail deformity, incomplete resolution of wart(s), wart recurrence, infection, and post-procedure pain which can be highly variable and in some cases severe.\\n- Discussed the expectation of pain during and after treatment. The pain can take several weeks to resolve in some cases.\\n- Patient expressed understanding. \\n- Following bleomycin treatment today, site(s) were treated with liquid nitrogen cryosurgery with patient's informed consent.\\n- Keep treated areas clean and covered with Vaseline ointment or Aquaphor and a bandage until healed.\\n- It can take between 4-6 weeks to heal after treatment with bleomycin.\\n- Notify the office if pain is worsening rather than improving after 48 hours, should any signs of infection be appreciated, or if any complications arise. Patient expressed understanding.\\n\\nVolume and Concentration: 0.1 ccs of a 1 units/cc solution\\nTotal Units Injected: 0.1 units\\[facility] #: [id number]

## 2023-06-26 NOTE — PROCEDURE: COUNSELING
Detail Level: Generalized
Detail Level: Zone
Detail Level: Simple
Detail Level: Detailed
Patient Specific Counseling (Will Not Stick From Patient To Patient): Advised patient to utilize Aquaphor daily to affected area. Provided samples.

## 2023-08-24 ENCOUNTER — TRANSFERRED RECORDS (OUTPATIENT)
Dept: HEALTH INFORMATION MANAGEMENT | Facility: CLINIC | Age: 68
End: 2023-08-24
Payer: COMMERCIAL

## 2023-09-05 ENCOUNTER — TRANSFERRED RECORDS (OUTPATIENT)
Dept: HEALTH INFORMATION MANAGEMENT | Facility: CLINIC | Age: 68
End: 2023-09-05
Payer: COMMERCIAL

## 2023-09-11 ENCOUNTER — TRANSFERRED RECORDS (OUTPATIENT)
Dept: HEALTH INFORMATION MANAGEMENT | Facility: CLINIC | Age: 68
End: 2023-09-11
Payer: COMMERCIAL

## 2023-10-17 ENCOUNTER — TRANSFERRED RECORDS (OUTPATIENT)
Dept: HEALTH INFORMATION MANAGEMENT | Facility: CLINIC | Age: 68
End: 2023-10-17
Payer: COMMERCIAL

## 2024-06-25 ENCOUNTER — TRANSFERRED RECORDS (OUTPATIENT)
Dept: HEALTH INFORMATION MANAGEMENT | Facility: CLINIC | Age: 69
End: 2024-06-25
Payer: COMMERCIAL

## 2024-07-10 ENCOUNTER — TRANSFERRED RECORDS (OUTPATIENT)
Dept: HEALTH INFORMATION MANAGEMENT | Facility: CLINIC | Age: 69
End: 2024-07-10
Payer: COMMERCIAL

## 2024-10-01 ENCOUNTER — TRANSFERRED RECORDS (OUTPATIENT)
Dept: HEALTH INFORMATION MANAGEMENT | Facility: CLINIC | Age: 69
End: 2024-10-01
Payer: COMMERCIAL

## 2024-10-11 ENCOUNTER — TRANSFERRED RECORDS (OUTPATIENT)
Dept: HEALTH INFORMATION MANAGEMENT | Facility: CLINIC | Age: 69
End: 2024-10-11
Payer: COMMERCIAL

## 2025-01-20 ENCOUNTER — TRANSFERRED RECORDS (OUTPATIENT)
Dept: HEALTH INFORMATION MANAGEMENT | Facility: CLINIC | Age: 70
End: 2025-01-20
Payer: COMMERCIAL